# Patient Record
Sex: FEMALE | Race: WHITE | Employment: OTHER | URBAN - NONMETROPOLITAN AREA
[De-identification: names, ages, dates, MRNs, and addresses within clinical notes are randomized per-mention and may not be internally consistent; named-entity substitution may affect disease eponyms.]

---

## 2021-02-02 ENCOUNTER — HOSPITAL ENCOUNTER (INPATIENT)
Age: 86
LOS: 3 days | Discharge: HOME | DRG: 189 | End: 2021-02-05
Attending: FAMILY MEDICINE | Admitting: HOSPITALIST
Payer: MEDICARE

## 2021-02-02 ENCOUNTER — APPOINTMENT (OUTPATIENT)
Dept: GENERAL RADIOLOGY | Age: 86
DRG: 189 | End: 2021-02-02
Payer: MEDICARE

## 2021-02-02 DIAGNOSIS — E87.1 HYPONATREMIA: ICD-10-CM

## 2021-02-02 DIAGNOSIS — J12.9 VIRAL PNEUMONIA: ICD-10-CM

## 2021-02-02 DIAGNOSIS — T73.2XXA FATIGUE DUE TO EXPOSURE, INITIAL ENCOUNTER: Primary | ICD-10-CM

## 2021-02-02 DIAGNOSIS — R91.8 OPACITIES OF BOTH LUNGS PRESENT ON CHEST X-RAY: ICD-10-CM

## 2021-02-02 PROBLEM — N39.0 UTI (URINARY TRACT INFECTION): Status: ACTIVE | Noted: 2021-02-02

## 2021-02-02 LAB
ALBUMIN SERPL-MCNC: 2.6 GM/DL (ref 3.4–5)
ALP BLD-CCNC: 64 U/L (ref 46–116)
ALT SERPL-CCNC: 37 U/L (ref 14–63)
AMORPHOUS: ABNORMAL
ANION GAP: 10 MEQ/L (ref 8–16)
AST SERPL-CCNC: 20 U/L (ref 15–37)
BACTERIA: ABNORMAL
BASOPHILS # BLD: 0.2 %
BASOPHILS ABSOLUTE: 0 THOU/MM3 (ref 0–0.1)
BILIRUB SERPL-MCNC: 0.9 MG/DL (ref 0.2–1)
BILIRUBIN URINE: NEGATIVE
BLOOD, URINE: ABNORMAL
BUN BLDV-MCNC: 28 MG/DL (ref 7–18)
CASTS UA: ABNORMAL /LPF
CHARACTER, URINE: CLEAR
CHLORIDE BLD-SCNC: 92 MEQ/L (ref 98–107)
CO2: 27 MEQ/L (ref 21–32)
COLOR: YELLOW
CREAT SERPL-MCNC: 1.3 MG/DL (ref 0.6–1.3)
CRYSTALS, UA: ABNORMAL
EKG ATRIAL RATE: 100 BPM
EKG P AXIS: 86 DEGREES
EKG P-R INTERVAL: 180 MS
EKG Q-T INTERVAL: 338 MS
EKG QRS DURATION: 94 MS
EKG QTC CALCULATION (BAZETT): 436 MS
EKG R AXIS: 45 DEGREES
EKG T AXIS: 94 DEGREES
EKG VENTRICULAR RATE: 100 BPM
EOSINOPHIL # BLD: 0.4 %
EOSINOPHILS ABSOLUTE: 0 THOU/MM3 (ref 0–0.4)
EPITHELIAL CELLS, UA: ABNORMAL /HPF
GFR, ESTIMATED: 41 ML/MIN/1.73M2
GLUCOSE BLD-MCNC: 147 MG/DL (ref 74–106)
GLUCOSE, URINE: NEGATIVE MG/DL
HCT VFR BLD CALC: 42 % (ref 37–47)
HEMOGLOBIN: 14 GM/DL (ref 12–16)
IMMATURE GRANS (ABS): 0.26 THOU/MM3 (ref 0–0.07)
IMMATURE GRANULOCYTES: 2.4 %
KETONES, URINE: NEGATIVE
LACTATE: 1.6 MMOL/L (ref 0.9–1.7)
LEUKOCYTE ESTERASE, URINE: ABNORMAL
LYMPHOCYTES # BLD: 16.2 %
LYMPHOCYTES ABSOLUTE: 1.8 THOU/MM3 (ref 1–4.8)
MCH RBC QN AUTO: 29.4 PG (ref 27–31)
MCHC RBC AUTO-ENTMCNC: 33.3 GM/DL (ref 33–37)
MCV RBC AUTO: 88.3 FL (ref 81–99)
MONOCYTES # BLD: 12 %
MONOCYTES ABSOLUTE: 1.3 THOU/MM3 (ref 0.4–1.3)
MUCUS: ABNORMAL
NITRITE, URINE: NEGATIVE
NUCLEATED RED BLOOD CELLS: 0 /100 WBC
PDW BLD-RTO: 13.5 % (ref 11.5–14.5)
PH UA: 6 (ref 5–9)
PLATELET # BLD: 341 THOU/MM3 (ref 130–400)
PMV BLD AUTO: 7 FL (ref 7.4–10.4)
POC CALCIUM: 8.5 MG/DL (ref 8.5–10.1)
POTASSIUM SERPL-SCNC: 4.4 MEQ/L (ref 3.5–5.1)
PROCALCITONIN: 0.09 NG/ML (ref 0.01–0.09)
PROTEIN UA: 30 MG/DL
RBC # BLD: 4.76 MILL/MM3 (ref 4.2–5.4)
RBC UA: ABNORMAL /HPF
REFLEX TO URINE C & S: ABNORMAL
SEG NEUTROPHILS: 68.8 %
SEGMENTED NEUTROPHILS ABSOLUTE COUNT: 7.4 THOU/MM3 (ref 1.8–7.7)
SODIUM BLD-SCNC: 129 MEQ/L (ref 135–145)
SODIUM BLD-SCNC: 129 MEQ/L (ref 136–145)
SPECIFIC GRAVITY UA: 1.01 (ref 1–1.03)
TOTAL PROTEIN: 6.9 GM/DL (ref 6.4–8.2)
TROPONIN I: < 0.017 NG/ML (ref 0.02–0.06)
UROBILINOGEN, URINE: 0.2 EU/DL (ref 0–1)
WBC # BLD: 11.2 THOU/MM3 (ref 4.8–10.8)
WBC UA: ABNORMAL /HPF

## 2021-02-02 PROCEDURE — 71045 X-RAY EXAM CHEST 1 VIEW: CPT

## 2021-02-02 PROCEDURE — 2580000003 HC RX 258: Performed by: FAMILY MEDICINE

## 2021-02-02 PROCEDURE — 99223 1ST HOSP IP/OBS HIGH 75: CPT | Performed by: PHYSICIAN ASSISTANT

## 2021-02-02 PROCEDURE — 93005 ELECTROCARDIOGRAM TRACING: CPT | Performed by: FAMILY MEDICINE

## 2021-02-02 PROCEDURE — 84484 ASSAY OF TROPONIN QUANT: CPT

## 2021-02-02 PROCEDURE — 83605 ASSAY OF LACTIC ACID: CPT

## 2021-02-02 PROCEDURE — 6370000000 HC RX 637 (ALT 250 FOR IP): Performed by: PHYSICIAN ASSISTANT

## 2021-02-02 PROCEDURE — 85025 COMPLETE CBC W/AUTO DIFF WBC: CPT

## 2021-02-02 PROCEDURE — 1200000003 HC TELEMETRY R&B

## 2021-02-02 PROCEDURE — 87040 BLOOD CULTURE FOR BACTERIA: CPT

## 2021-02-02 PROCEDURE — 81001 URINALYSIS AUTO W/SCOPE: CPT

## 2021-02-02 PROCEDURE — 36415 COLL VENOUS BLD VENIPUNCTURE: CPT

## 2021-02-02 PROCEDURE — 80053 COMPREHEN METABOLIC PANEL: CPT

## 2021-02-02 PROCEDURE — 84295 ASSAY OF SERUM SODIUM: CPT

## 2021-02-02 PROCEDURE — 2580000003 HC RX 258: Performed by: PHYSICIAN ASSISTANT

## 2021-02-02 PROCEDURE — 84145 PROCALCITONIN (PCT): CPT

## 2021-02-02 PROCEDURE — 93010 ELECTROCARDIOGRAM REPORT: CPT | Performed by: NUCLEAR MEDICINE

## 2021-02-02 PROCEDURE — 99284 EMERGENCY DEPT VISIT MOD MDM: CPT

## 2021-02-02 PROCEDURE — 2709999900 HC NON-CHARGEABLE SUPPLY

## 2021-02-02 PROCEDURE — 6360000002 HC RX W HCPCS: Performed by: PHYSICIAN ASSISTANT

## 2021-02-02 RX ORDER — POLYETHYLENE GLYCOL 3350 17 G/17G
17 POWDER, FOR SOLUTION ORAL DAILY PRN
Status: DISCONTINUED | OUTPATIENT
Start: 2021-02-02 | End: 2021-02-05 | Stop reason: HOSPADM

## 2021-02-02 RX ORDER — SODIUM CHLORIDE 0.9 % (FLUSH) 0.9 %
10 SYRINGE (ML) INJECTION EVERY 12 HOURS SCHEDULED
Status: DISCONTINUED | OUTPATIENT
Start: 2021-02-02 | End: 2021-02-05 | Stop reason: HOSPADM

## 2021-02-02 RX ORDER — IPRATROPIUM BROMIDE AND ALBUTEROL SULFATE 2.5; .5 MG/3ML; MG/3ML
1 SOLUTION RESPIRATORY (INHALATION) EVERY 4 HOURS PRN
Status: DISCONTINUED | OUTPATIENT
Start: 2021-02-02 | End: 2021-02-05 | Stop reason: HOSPADM

## 2021-02-02 RX ORDER — SODIUM CHLORIDE 9 MG/ML
INJECTION, SOLUTION INTRAVENOUS CONTINUOUS
Status: DISCONTINUED | OUTPATIENT
Start: 2021-02-02 | End: 2021-02-04

## 2021-02-02 RX ORDER — PRAVASTATIN SODIUM 20 MG
20 TABLET ORAL DAILY
Status: DISCONTINUED | OUTPATIENT
Start: 2021-02-02 | End: 2021-02-05 | Stop reason: HOSPADM

## 2021-02-02 RX ORDER — MECLIZINE HYDROCHLORIDE CHEWABLE TABLETS 25 MG/1
25 TABLET, CHEWABLE ORAL 3 TIMES DAILY PRN
Status: DISCONTINUED | OUTPATIENT
Start: 2021-02-02 | End: 2021-02-05 | Stop reason: HOSPADM

## 2021-02-02 RX ORDER — ASPIRIN 81 MG/1
81 TABLET, CHEWABLE ORAL DAILY
Status: DISCONTINUED | OUTPATIENT
Start: 2021-02-02 | End: 2021-02-05 | Stop reason: HOSPADM

## 2021-02-02 RX ORDER — SODIUM CHLORIDE 0.9 % (FLUSH) 0.9 %
10 SYRINGE (ML) INJECTION PRN
Status: DISCONTINUED | OUTPATIENT
Start: 2021-02-02 | End: 2021-02-05 | Stop reason: HOSPADM

## 2021-02-02 RX ORDER — LOSARTAN POTASSIUM 100 MG/1
100 TABLET ORAL DAILY
Status: DISCONTINUED | OUTPATIENT
Start: 2021-02-02 | End: 2021-02-05 | Stop reason: HOSPADM

## 2021-02-02 RX ORDER — ACETAMINOPHEN 325 MG/1
650 TABLET ORAL EVERY 6 HOURS PRN
Status: DISCONTINUED | OUTPATIENT
Start: 2021-02-02 | End: 2021-02-05 | Stop reason: HOSPADM

## 2021-02-02 RX ORDER — PROMETHAZINE HYDROCHLORIDE 25 MG/1
12.5 TABLET ORAL EVERY 6 HOURS PRN
Status: DISCONTINUED | OUTPATIENT
Start: 2021-02-02 | End: 2021-02-05 | Stop reason: HOSPADM

## 2021-02-02 RX ORDER — ATENOLOL 25 MG/1
25 TABLET ORAL DAILY
Status: DISCONTINUED | OUTPATIENT
Start: 2021-02-02 | End: 2021-02-05 | Stop reason: HOSPADM

## 2021-02-02 RX ORDER — 0.9 % SODIUM CHLORIDE 0.9 %
500 INTRAVENOUS SOLUTION INTRAVENOUS ONCE
Status: COMPLETED | OUTPATIENT
Start: 2021-02-02 | End: 2021-02-02

## 2021-02-02 RX ORDER — ONDANSETRON 2 MG/ML
4 INJECTION INTRAMUSCULAR; INTRAVENOUS EVERY 6 HOURS PRN
Status: DISCONTINUED | OUTPATIENT
Start: 2021-02-02 | End: 2021-02-05 | Stop reason: HOSPADM

## 2021-02-02 RX ORDER — ACETAMINOPHEN 650 MG/1
650 SUPPOSITORY RECTAL EVERY 6 HOURS PRN
Status: DISCONTINUED | OUTPATIENT
Start: 2021-02-02 | End: 2021-02-05 | Stop reason: HOSPADM

## 2021-02-02 RX ORDER — CLONIDINE HYDROCHLORIDE 0.1 MG/1
0.1 TABLET ORAL EVERY 4 HOURS PRN
Status: DISCONTINUED | OUTPATIENT
Start: 2021-02-02 | End: 2021-02-05 | Stop reason: HOSPADM

## 2021-02-02 RX ADMIN — ASPIRIN 81 MG: 81 TABLET, CHEWABLE ORAL at 17:42

## 2021-02-02 RX ADMIN — SODIUM CHLORIDE 500 ML: 9 INJECTION, SOLUTION INTRAVENOUS at 10:46

## 2021-02-02 RX ADMIN — ENOXAPARIN SODIUM 30 MG: 30 INJECTION SUBCUTANEOUS at 17:37

## 2021-02-02 RX ADMIN — ATENOLOL 25 MG: 25 TABLET ORAL at 17:42

## 2021-02-02 RX ADMIN — PRAVASTATIN SODIUM 20 MG: 20 TABLET ORAL at 21:17

## 2021-02-02 RX ADMIN — SODIUM CHLORIDE: 9 INJECTION, SOLUTION INTRAVENOUS at 17:36

## 2021-02-02 SDOH — HEALTH STABILITY: MENTAL HEALTH: HOW OFTEN DO YOU HAVE A DRINK CONTAINING ALCOHOL?: NEVER

## 2021-02-02 ASSESSMENT — ENCOUNTER SYMPTOMS
VOMITING: 0
SHORTNESS OF BREATH: 0
COUGH: 0
NAUSEA: 0
SORE THROAT: 0

## 2021-02-02 NOTE — ED NOTES
Pt assited to room with family per private w/c. Pt pale. Daughter states was helping pt to br at home when she said she was weak, light headed and daughter helped her to floor. Pt denies any injury from falling. Daughter states she is positive for ovid on jan 23 and also had urine tested for uti that same day. Pt finished her antibiotic and dexamethasone. Pt eating well. Denies nausea or diarrhea.        Geno Rose RN  02/02/21 7621

## 2021-02-02 NOTE — ED NOTES
Pt states is resting comfortably. Dr Alem Cox speaking with family.        Mary Ann Mccarthy, RN  02/02/21 4227

## 2021-02-02 NOTE — ED NOTES
Care map return call and pt will go to telemetry unit and family permitted to visit. But no beds available at present. Family made aware.        Alicia Rodriguez RN  02/02/21 5509

## 2021-02-02 NOTE — ED NOTES
Up to bedside commode. Michael well. States she is feeling better.        Loan Neri, RN  02/02/21 [address], RN  02/02/21 2099

## 2021-02-02 NOTE — H&P
Hospitalist History & Physical    Patient:  Eugenia Cortez    Unit/Bed:6K-24/024-A  YOB: 1931  MRN: [id number]   Acct: [de-identified]   PCP: Juventino Owen MD  Code Status: Full Code    Date of Service: Pt seen/examined on 02/02/21 and admitted to Inpatient with expected LOS greater than two midnights due to medical therapy. Chief Complaint: weakness    Assessment/Plan:    1. Hyponatremia, mild: Na 129. Likely hypovolemic / dehydration. Check urine lytes. Start NS at 100cc/hr. Monitor Na Q8h. Notify provider of increase in Na >8meq in less than 24hrs. 2. Weakness: Likely due to dehydration. Check Orthostats, TSH. PT/OT. 3. Recent COVID-19: Positive on 1/22. 4. Acute hypoxic respiratory failure: CXR appearance consistent with atypical PNA. Per report, pt was placed on O2 in ED for SpO2 in the 80s with ambulation. This could just be post covid changes on XR and hypoxia. Pt is not complaining of any SOB, fever/chills, or productive cough. WBC only mildly elevated, 11.2. Will hold off on ABX for now. Check PCT, repeat CBC in AM. DuoNeb. IS. Wean O2 as tolerated. 5. CKD stage III: Cr stable with baseline. BMP in AM. Will hold her home losartan for today given her hyponatremia and dehydration. If Cr remains stable tomorrow, can resume. 6. Essential HTN: Cont home atenolol, clonidine. Losartan held as stated above. History of Present Illness:  29-year-old female with past medical history CKD, bladder cancer, hypertension who presented to the ED complaining of fatigue, weakness, dizziness. The patient states that she was diagnosed with Covid on 1/22/21. She states early this morning she woke up to use the restroom and became very weak, lightheaded and daughter helped her to the ground. The patient's daughter states that she was disoriented at that time. She is now AOx4, states she feels her weakness is improving. She reports mild dry cough.   Denies fever/chills, shortness of breath, chest pain, abdominal pain, N/V/D, dysuria, urinary frequency/urgency. Patient is afebrile, vital signs stable. Labs show hyponatremia, mild leukocytosis. Patient admitted to hospitalist service for further management. Review of Systems: Pertinent positives as noted in the HPI. All other systems reviewed and negative. Past Medical History:        Diagnosis Date    Bladder cancer [facility]) 2007    Chronic kidney disease, stage III (moderate)     Diastolic dysfunction     Hyperlipidemia     Unspecified essential hypertension        Past Surgical History:        Procedure Laterality Date    JOINT REPLACEMENT Right     hip       Home Medications:   No current facility-administered medications on file prior to encounter. Current Outpatient Medications on File Prior to Encounter   Medication Sig Dispense Refill    Coenzyme Q10 (CO Q 10) 100 MG CAPS Take by mouth      Calcium Carbonate (CALCIUM 600 PO) Take by mouth      GLUCOSAMINE SULFATE PO Take 1,000 mg by mouth      pravastatin (PRAVACHOL) 40 MG tablet Take 20 mg by mouth daily       atenolol (TENORMIN) 25 MG tablet Take 25 mg by mouth daily.  aspirin 81 MG tablet Take 81 mg by mouth daily.  meclizine (ANTIVERT) 12.5 MG tablet Take 25 mg by mouth 3 times daily as needed.  losartan (COZAAR) 50 MG tablet Take 100 mg by mouth daily       therapeutic multivitamin-minerals (THERAGRAN-M) tablet Take 1 tablet by mouth daily.  cloNIDine (CATAPRES) 0.1 MG tablet Take 0.1 mg by mouth as needed. Allergies:    Morphine and Sulfa antibiotics    Social History:    reports that she has never smoked. She has never used smokeless tobacco. She reports that she does not drink alcohol. Family History:   History reviewed. No pertinent family history.     Diet:  DIET GENERAL; No Added Salt (3-4 GM)      Physical Exam:  /61   Pulse 101   Temp 98 °F (36.7 °C) (Oral)   Resp 20   Ht 5' 8\" (1.727 m)   Wt 153 lb 4.8 oz (69.5 kg)   SpO2 94%   BMI 23.31 kg/m²   General:   Pleasant female. NAD. HEENT:  normocephalic and atraumatic. No scleral icterus. PERR. Neck: supple. No JVD. No thyromegaly. Lungs: clear to auscultation. No retractions  Cardiac: RRR without murmur. Abdomen: soft. Nontender. Bowel sounds positive. Extremities:  No clubbing, cyanosis, or edema x 4. Vasculature: capillary refill < 3 seconds. Palpable LE pulses bilaterally. Skin:  warm and dry. No rashes or lesions. Psych:  Alert and oriented x3. Affect appropriate  Lymph:  No supraclavicular adenopathy. Neurologic:  No focal deficit. No seizures. Data: (All radiographs, tracings, PFTs, and imaging are personally viewed and interpreted unless otherwise noted)  Labs:   Recent Labs     02/02/21  0845   WBC 11.2*   HGB 14.0   HCT 42.0        Recent Labs     02/02/21  0845   *   K 4.4   CL 92*   CO2 27   BUN 28*   CREATININE 1.3     Recent Labs     02/02/21  0845   AST 20   ALT 37   BILITOT 0.9   ALKPHOS 64     No results for input(s): INR in the last 72 hours. Recent Labs     02/02/21  1053   TROPONINI < 0.017     Urinalysis:   Lab Results   Component Value Date    NITRU NEGATIVE 02/02/2021    WBCUA 2-4 02/02/2021    WBCUA 50-75 09/23/2011    BACTERIA FEW 02/02/2021    RBCUA 0-2 02/02/2021    RBCUA 5-10 09/23/2011    BLOODU TRACE 02/02/2021    SPECGRAV 1.015 02/02/2021    GLUCOSEU neg 09/10/2014       EKG: normal sinus rhythm, no blocks or conduction defects, no ischemic changes    Radiology:  XR CHEST PORTABLE   Final Result   Bilateral slightly lower lobe and peripheral predominant lung opacities likely acute infectious infiltrates. Some nodularity of the opacities such that the underlying lung nodules are not excluded. **This report has been created using voice recognition software. It may contain minor errors which are inherent in voice recognition technology. **      Final report electronically signed by Dr. Jeff Haq on 2/2/2021 9:38 AM        Xr Chest Portable    Result Date: 2/2/2021  PROCEDURE: XR CHEST PORTABLE CLINICAL INFORMATION: short of breath . COMPARISON: 9/23/2011 TECHNIQUE: Portable upright FINDINGS: Heart size normal. Mediastinum is not widened. Peripheral bilateral lower lobe predominant opacities silhouette which have a nodular appearance. More patchy appearance at the lowest most portion of the lungs. No effusions. Pulmonary vessels are not congested. Numerous EKG leads overlie the upper chest. This decreases detail the right apex. Bilateral slightly lower lobe and peripheral predominant lung opacities likely acute infectious infiltrates. Some nodularity of the opacities such that the underlying lung nodules are not excluded. **This report has been created using voice recognition software. It may contain minor errors which are inherent in voice recognition technology. ** Final report electronically signed by Dr. Jeff Haq on 2/2/2021 9:38 AM        Tele:   [x] yes             [] no      Thank you Eunice Eubanks MD for the opportunity to be involved in this patient's care.     Electronically signed by Adolfo Zhu PA-C on 2/2/2021 at 5:29 PM

## 2021-02-02 NOTE — ED NOTES
Family not sure of admission at present. Aware of no visitor policy when on Covid unit. Manager checking on visitor status.        Geno Rose RN  02/02/21 96 [facility] [place]  02/02/21 1318

## 2021-02-02 NOTE — ED NOTES
Up to bedside co mode with assist.  Pt lethargic. Daughters assisted. oxygen 80%  After arranging back in bed. O2 applied at 2l/nc. Slight lorenzo.       Shay Mendoza RN  02/02/21 2208

## 2021-02-02 NOTE — ED PROVIDER NOTES
[facility]  eMERGENCY dEPARTMENT eNCOUnter          [address] [facility]       Chief Complaint   Patient presents with    Fatigue    Dizziness    Urinary Tract Infection       Nurses Notes reviewed and I agree except as noted in the HPI. HISTORY OF PRESENT ILLNESS    Vernon Tran is a 80 y.o. female who presents fatigue. Patient diagnosed with covid on January 22, 2021. In recent days according to daughters who are at bedside the patient is fatigued and very weak limited mobility at home. She denies fever,chills. Denies urinary symptoms. Notes good oral intake. REVIEW OF SYSTEMS     Review of Systems   Constitutional: Positive for fatigue. Negative for chills and fever. HENT: Negative for congestion and sore throat. Respiratory: Negative for cough and shortness of breath. Cardiovascular: Negative for chest pain and palpitations. Gastrointestinal: Negative for nausea and vomiting. Genitourinary: Negative for dysuria and frequency. Neurological: Positive for dizziness and weakness. Psychiatric/Behavioral: Negative for agitation and behavioral problems. PAST MEDICAL HISTORY    has a past medical history of Bladder cancer ([address] Chronic kidney disease, stage III (moderate), Diastolic dysfunction, Hyperlipidemia, and Unspecified essential hypertension. SURGICAL HISTORY      has a past surgical history that includes joint replacement (Right). CURRENT MEDICATIONS       Previous Medications    ASPIRIN 81 MG TABLET    Take 81 mg by mouth daily. ATENOLOL (TENORMIN) 25 MG TABLET    Take 25 mg by mouth daily. CALCIUM CARBONATE (CALCIUM 600 PO)    Take by mouth    CLONIDINE (CATAPRES) 0.1 MG TABLET    Take 0.1 mg by mouth as needed.     COENZYME Q10 (CO Q 10) 100 MG CAPS    Take by mouth    GLUCOSAMINE SULFATE PO    Take 1,000 mg by mouth    LOSARTAN (COZAAR) 50 MG TABLET    Take 100 mg by mouth daily     MECLIZINE (ANTIVERT) 12.5 MG TABLET    Take 25 mg by mouth 3 times daily as needed. PRAVASTATIN (PRAVACHOL) 40 MG TABLET    Take 20 mg by mouth daily     THERAPEUTIC MULTIVITAMIN-MINERALS (THERAGRAN-M) TABLET    Take 1 tablet by mouth daily. ALLERGIES     is allergic to morphine and sulfa antibiotics. FAMILY HISTORY     has no family status information on file. family history is not on file. SOCIAL HISTORY      reports that she has never smoked. She has never used smokeless tobacco. She reports that she does not drink alcohol. PHYSICAL EXAM     INITIAL VITALS:  height is 5' 8\" (1.727 m) and weight is 145 lb (65.8 kg). Her temporal temperature is 98.4 °F (36.9 °C). Her blood pressure is 107/56 (abnormal) and her pulse is 97. Her respiration is 25 and oxygen saturation is 97%. Physical Exam  Vitals signs and nursing note reviewed. Constitutional:       General: She is not in acute distress. Appearance: She is ill-appearing. HENT:      Nose: Nose normal.   Eyes:      Conjunctiva/sclera: Conjunctivae normal.      Pupils: Pupils are equal, round, and reactive to light. Neck:      Musculoskeletal: Normal range of motion and neck supple. Cardiovascular:      Rate and Rhythm: Normal rate and regular rhythm. Pulses: Normal pulses. Heart sounds: Normal heart sounds. Pulmonary:      Effort: No respiratory distress. Breath sounds: No wheezing. Abdominal:      General: Abdomen is flat. There is no distension. Tenderness: There is no abdominal tenderness. Lymphadenopathy:      Cervical: No cervical adenopathy. Skin:     General: Skin is dry. Capillary Refill: Capillary refill takes less than 2 seconds. Findings: No erythema or rash. Neurological:      General: No focal deficit present. Mental Status: She is alert and oriented to person, place, and time.          DIFFERENTIAL DIAGNOSIS:   Covid,uti,metabolic derangement,dehydration,    DIAGNOSTIC RESULTS     EKG: All EKG's are interpreted by the Emergency Department Physician who either signs or Co-signs this chart in the absence of a cardiologist.      RADIOLOGY: non-plain film images(s) such as CT, Ultrasound and MRI are read by the radiologist.      XR CHEST PORTABLE (Final result)  Result time 02/02/21 09:38:31  Final result by Ton Casillas MD (02/02/21 09:38:31)                Impression:    Bilateral slightly lower lobe and peripheral predominant lung opacities likely acute infectious infiltrates. Some nodularity of the opacities such that the underlying lung nodules are not excluded. **This report has been created using voice recognition software.  It may contain minor errors which are inherent in voice recognition technology. **     Final report electronically signed by Dr. Macarena Crowe on 2/2/2021 9:38 AM            Narrative:    PROCEDURE: XR CHEST PORTABLE     CLINICAL INFORMATION: short of breath . COMPARISON: 9/23/2011     TECHNIQUE: Portable upright     FINDINGS: Heart size normal. Mediastinum is not widened. Peripheral bilateral lower lobe predominant opacities silhouette which have a nodular appearance. More patchy appearance at the lowest most portion of the lungs. No effusions. Pulmonary vessels are not congested.    Numerous EKG leads overlie the upper chest. This decreases detail the right apex.                     LABS:   Labs Reviewed   COMPREHENSIVE METABOLIC PANEL - Abnormal; Notable for the following components:       Result Value    Glucose 147 (*)     BUN 28 (*)     Sodium 129 (*)     Chloride 92 (*)     Albumin 2.6 (*)     All other components within normal limits   CBC WITH AUTO DIFFERENTIAL - Abnormal; Notable for the following components:    WBC 11.2 (*)     MPV 7.0 (*)     All other components within normal limits   URINE RT REFLEX TO CULTURE - Abnormal; Notable for the following components:    Blood, Urine TRACE (*)     Protein, UA 30 (*)     Leukocyte Esterase, Urine TRACE (*)     All other components within normal limits   GLOMERULAR FILTRATION RATE, ESTIMATED - Abnormal; Notable for the following components:    GFR, Estimated 41 (*)     All other components within normal limits   CULTURE, BLOOD 1   CULTURE, BLOOD 2   LACTIC ACID   ANION GAP   TROPONIN   DIFFERENTIAL       EMERGENCY DEPARTMENT COURSE:   Vitals:    Vitals:    02/02/21 0915 02/02/21 1011 02/02/21 1017 02/02/21 1049   BP: (!) 118/56 92/72  (!) 107/56   Pulse: 93 101 103 97   Resp: 19 23 23 25   Temp:       TempSrc:       SpO2: 96% 97% 95% 97%   Weight:       Height:         On exam the patient is alert and oriented x4. She is not complaining of any particular symptoms she is afebrile. Initially on evaluation her pulse ox was in the upper 80 percentile with any form of exertion. She was placed on 2 L of oxygen and oxygen saturation improved to 95-96%. She does note a history of bladder cancer. Urinalysis did show trace amount of leukocytes. Patient is denying any current dysuria or increased frequency. Chest x-ray did show bilateral slightly lower lobe and peripheral predominant lung opacities likely acute infectious infiltrates. The lung picture based on radiographic would be consistent with her Covid diagnosis of approximately 10 days. Metabolic panel did show a sodium of 129, BUN of 28, creatinine of 1.3. I did start gentle hydration on this patient with normal saline. There is trace amount of leukocytes, and trace amount of blood. She was not experiencing any urinary symptoms at this time so I held off on any further antibiotics regarding her urinary tract. Troponin was negative. EKG showed a normal sinus rhythm. As relates to her bilateral lower lobe and peripheral predominant lung opacities likely reflecting an acute infectious infiltrate at this time based on her Covid diagnosis more than likely this is a viral pneumonia. I held off on any further antibiotics at this time.     CRITICAL CARE: CONSULTS:      PROCEDURES:  None    FINAL IMPRESSION      1. Fatigue due to exposure, initial encounter    2. Opacities of both lungs present on chest x-ray    3. Hyponatremia    4. Viral pneumonia          DISPOSITION/PLAN   Admit. PATIENT REFERRED TO:  No follow-up provider specified.     DISCHARGE MEDICATIONS:  New Prescriptions    No medications on file       (Please note that portions of this note were completed with a voice recognition program.  Efforts were made to edit the dictations but occasionally words are mis-transcribed.)    MD Monik Tierney MD  02/02/21 4123

## 2021-02-03 LAB
ALBUMIN SERPL-MCNC: 2.4 G/DL (ref 3.5–5.1)
ALP BLD-CCNC: 43 U/L (ref 38–126)
ALT SERPL-CCNC: 17 U/L (ref 11–66)
ANION GAP SERPL CALCULATED.3IONS-SCNC: 8 MEQ/L (ref 8–16)
AST SERPL-CCNC: 11 U/L (ref 5–40)
BILIRUB SERPL-MCNC: 0.5 MG/DL (ref 0.3–1.2)
BUN BLDV-MCNC: 22 MG/DL (ref 7–22)
CALCIUM SERPL-MCNC: 7.9 MG/DL (ref 8.5–10.5)
CHLORIDE BLD-SCNC: 101 MEQ/L (ref 98–111)
CO2: 22 MEQ/L (ref 23–33)
CREAT SERPL-MCNC: 0.9 MG/DL (ref 0.4–1.2)
CREATININE URINE: 105.3 MG/DL
EOSINOPHIL SMEAR: NORMAL
ERYTHROCYTE [DISTWIDTH] IN BLOOD BY AUTOMATED COUNT: 12.6 % (ref 11.5–14.5)
ERYTHROCYTE [DISTWIDTH] IN BLOOD BY AUTOMATED COUNT: 41.7 FL (ref 35–45)
GFR SERPL CREATININE-BSD FRML MDRD: 59 ML/MIN/1.73M2
GLUCOSE BLD-MCNC: 148 MG/DL (ref 70–108)
HCT VFR BLD CALC: 34.2 % (ref 37–47)
HEMOGLOBIN: 11.1 GM/DL (ref 12–16)
LACTIC ACID: 0.9 MMOL/L (ref 0.5–2.2)
MCH RBC QN AUTO: 29.4 PG (ref 26–33)
MCHC RBC AUTO-ENTMCNC: 32.5 GM/DL (ref 32.2–35.5)
MCV RBC AUTO: 90.7 FL (ref 81–99)
OSMOLALITY URINE: 508 MOSMOL/KG (ref 250–750)
PLATELET # BLD: 279 THOU/MM3 (ref 130–400)
PMV BLD AUTO: 9.3 FL (ref 9.4–12.4)
POTASSIUM REFLEX MAGNESIUM: 4.5 MEQ/L (ref 3.5–5.2)
RBC # BLD: 3.77 MILL/MM3 (ref 4.2–5.4)
SODIUM BLD-SCNC: 131 MEQ/L (ref 135–145)
SODIUM BLD-SCNC: 131 MEQ/L (ref 135–145)
SODIUM URINE: 49 MEQ/L
SPECIMEN: NORMAL
TOTAL PROTEIN: 5.3 G/DL (ref 6.1–8)
TSH SERPL DL<=0.05 MIU/L-ACNC: 1.76 UIU/ML (ref 0.4–4.2)
WBC # BLD: 9.1 THOU/MM3 (ref 4.8–10.8)

## 2021-02-03 PROCEDURE — 99232 SBSQ HOSP IP/OBS MODERATE 35: CPT | Performed by: NURSE PRACTITIONER

## 2021-02-03 PROCEDURE — 36415 COLL VENOUS BLD VENIPUNCTURE: CPT

## 2021-02-03 PROCEDURE — 80053 COMPREHEN METABOLIC PANEL: CPT

## 2021-02-03 PROCEDURE — 6370000000 HC RX 637 (ALT 250 FOR IP): Performed by: PHYSICIAN ASSISTANT

## 2021-02-03 PROCEDURE — 89190 NASAL SMEAR FOR EOSINOPHILS: CPT

## 2021-02-03 PROCEDURE — 6370000000 HC RX 637 (ALT 250 FOR IP): Performed by: NURSE PRACTITIONER

## 2021-02-03 PROCEDURE — 6360000002 HC RX W HCPCS: Performed by: PHYSICIAN ASSISTANT

## 2021-02-03 PROCEDURE — 84443 ASSAY THYROID STIM HORMONE: CPT

## 2021-02-03 PROCEDURE — 84295 ASSAY OF SERUM SODIUM: CPT

## 2021-02-03 PROCEDURE — 2580000003 HC RX 258: Performed by: PHYSICIAN ASSISTANT

## 2021-02-03 PROCEDURE — 82570 ASSAY OF URINE CREATININE: CPT

## 2021-02-03 PROCEDURE — 84300 ASSAY OF URINE SODIUM: CPT

## 2021-02-03 PROCEDURE — 87086 URINE CULTURE/COLONY COUNT: CPT

## 2021-02-03 PROCEDURE — 83605 ASSAY OF LACTIC ACID: CPT

## 2021-02-03 PROCEDURE — 85027 COMPLETE CBC AUTOMATED: CPT

## 2021-02-03 PROCEDURE — 1200000003 HC TELEMETRY R&B

## 2021-02-03 PROCEDURE — 83935 ASSAY OF URINE OSMOLALITY: CPT

## 2021-02-03 RX ORDER — ZINC SULFATE 50(220)MG
50 CAPSULE ORAL DAILY
Status: DISCONTINUED | OUTPATIENT
Start: 2021-02-03 | End: 2021-02-05 | Stop reason: HOSPADM

## 2021-02-03 RX ORDER — ASCORBIC ACID 500 MG
1000 TABLET ORAL DAILY
Status: DISCONTINUED | OUTPATIENT
Start: 2021-02-03 | End: 2021-02-05 | Stop reason: HOSPADM

## 2021-02-03 RX ORDER — VITAMIN B COMPLEX
1000 TABLET ORAL DAILY
Status: DISCONTINUED | OUTPATIENT
Start: 2021-02-03 | End: 2021-02-05 | Stop reason: HOSPADM

## 2021-02-03 RX ADMIN — ENOXAPARIN SODIUM 30 MG: 30 INJECTION SUBCUTANEOUS at 16:44

## 2021-02-03 RX ADMIN — SODIUM CHLORIDE: 9 INJECTION, SOLUTION INTRAVENOUS at 04:28

## 2021-02-03 RX ADMIN — ASPIRIN 81 MG: 81 TABLET, CHEWABLE ORAL at 08:33

## 2021-02-03 RX ADMIN — OXYCODONE HYDROCHLORIDE AND ACETAMINOPHEN 1000 MG: 500 TABLET ORAL at 16:04

## 2021-02-03 RX ADMIN — PRAVASTATIN SODIUM 20 MG: 20 TABLET ORAL at 19:55

## 2021-02-03 RX ADMIN — SODIUM CHLORIDE: 9 INJECTION, SOLUTION INTRAVENOUS at 15:03

## 2021-02-03 RX ADMIN — Medication 1000 UNITS: at 16:04

## 2021-02-03 RX ADMIN — Medication 50 MG: at 16:04

## 2021-02-03 RX ADMIN — ATENOLOL 25 MG: 25 TABLET ORAL at 08:34

## 2021-02-03 RX ADMIN — SODIUM CHLORIDE, PRESERVATIVE FREE 10 ML: 5 INJECTION INTRAVENOUS at 08:33

## 2021-02-03 NOTE — PROGRESS NOTES
Hospitalist Progress Note    Patient:  Honey Cortze      Unit/Bed:6K-24/024-A    YOB: 1931    MRN: [id number]       Acct: [de-identified]     PCP: Denisse Rosario MD    Date of Admission: 2/2/2021    Assessment/Plan:    1. Acute hypoxic respiratory failure. Question secondary to recent COVID 19 1/22. PCT negative. Afebrile. Encourage IS/Acapella. Add Zinc, Vit D and Vit C. Wean oxygen as able. 2. Leukocytosis, resolved. PCT negative. BC pending. 3. Hyponatremia, mild. Improved with hydration. Continue IVF. 4. Weakness due to recent viral illness. PT/OT. 5. CKD stage III. Creatinine stable. 6. Essential hypertension. Resume ARB with holding parameters. 7. HX bladder CA. 8. Chronic diastolic heart failure. No overt signs of decompensation. Chief Complaint: weakness    Hospital Course:     66-year-old female with past medical history CKD, bladder cancer, hypertension who presented to the ED complaining of fatigue, weakness, dizziness. The patient states that she was diagnosed with Covid on 1/22/21. She states early this morning she woke up to use the restroom and became very weak, lightheaded and daughter helped her to the ground. The patient's daughter states that she was disoriented at that time. She is now AOx4, states she feels her weakness is improving. She reports mild dry cough. Denies fever/chills, shortness of breath, chest pain, abdominal pain, N/V/D, dysuria, urinary frequency/urgency. Patient is afebrile, vital signs stable. Labs show hyponatremia, mild leukocytosis. Patient admitted to hospitalist service for further management. Subjective (past 24 hours): Up in chair. Denies SOB. No LOUIS. Weak. Feels better than on admission.        Medications:  Reviewed    Infusion Medications    sodium chloride 100 mL/hr at 02/03/21 3312     Scheduled Medications    aspirin  81 mg Oral Daily    atenolol  25 mg Oral Daily    pravastatin  20 mg Oral Daily    sodium chloride flush  10 mL Intravenous 2 times per day    enoxaparin  30 mg Subcutaneous Q24H    [Held by provider] losartan  100 mg Oral Daily     PRN Meds: cloNIDine, meclizine, sodium chloride flush, promethazine **OR** ondansetron, polyethylene glycol, acetaminophen **OR** acetaminophen, ipratropium-albuterol      Intake/Output Summary (Last 24 hours) at 2/3/2021 1250  Last data filed at 2/3/2021 1208  Gross per 24 hour   Intake 1996.73 ml   Output 100 ml   Net 1896.73 ml       Diet:  DIET GENERAL; No Added Salt (3-4 GM)    Exam:  BP (!) 119/49   Pulse 78   Temp 97.4 °F (36.3 °C) (Oral)   Resp 20   Ht 5' 8\" (1.727 m)   Wt 148 lb 9.6 oz (67.4 kg)   SpO2 94%   BMI 22.59 kg/m²     General appearance: No apparent distress, appears stated age and cooperative. HEENT: Pupils equal, round, and reactive to light. Conjunctivae/corneas clear. Neck: Supple, with full range of motion. No jugular venous distention. Trachea midline. Respiratory:  Normal respiratory effort. Crackles RLL  Cardiovascular: Regular rate and rhythm with normal S1/S2 without murmurs, rubs or gallops. Abdomen: Soft, non-tender, non-distended with normal bowel sounds. Musculoskeletal: passive and active ROM x 4 extremities. Skin: Skin color, texture, turgor normal.    Neurologic:  Neurovascularly intact without any focal sensory/motor deficits.  Cranial nerves: II-XII intact, grossly non-focal.  Psychiatric: Alert and oriented, thought content appropriate  Capillary Refill: Brisk,< 3 seconds   Peripheral Pulses: +2 palpable, equal bilaterally       Labs:   Recent Labs     02/02/21  0845 02/03/21  0306   WBC 11.2* 9.1   HGB 14.0 11.1*   HCT 42.0 34.2*    279     Recent Labs     02/02/21  0845 02/02/21  1748 02/03/21  0306 02/03/21  0922   * 129* 131* 131*   K 4.4  --  4.5  --    CL 92*  --  101  --    CO2 27  --  22*  --    BUN 28*  --  22  --    CREATININE 1.3  --  0.9  --    CALCIUM  --   --  7.9*  --      Recent Labs 02/02/21  0845 02/03/21  0306   AST 20 11   ALT 37 17   BILITOT 0.9 0.5   ALKPHOS 64 43     No results for input(s): INR in the last 72 hours. Recent Labs     02/02/21  1053   TROPONINI < 0.017     Recent Labs     02/02/21  1748   PROCAL 0.09       Microbiology:      Urinalysis:      Lab Results   Component Value Date    NITRU NEGATIVE 02/02/2021    WBCUA 2-4 02/02/2021    WBCUA 50-75 09/23/2011    BACTERIA FEW 02/02/2021    RBCUA 0-2 02/02/2021    RBCUA 5-10 09/23/2011    BLOODU TRACE 02/02/2021    SPECGRAV 1.015 02/02/2021    GLUCOSEU neg 09/10/2014       Radiology:  Xr Chest Portable    Result Date: 2/2/2021  PROCEDURE: XR CHEST PORTABLE CLINICAL INFORMATION: short of breath . COMPARISON: 9/23/2011 TECHNIQUE: Portable upright FINDINGS: Heart size normal. Mediastinum is not widened. Peripheral bilateral lower lobe predominant opacities silhouette which have a nodular appearance. More patchy appearance at the lowest most portion of the lungs. No effusions. Pulmonary vessels are not congested. Numerous EKG leads overlie the upper chest. This decreases detail the right apex. Bilateral slightly lower lobe and peripheral predominant lung opacities likely acute infectious infiltrates. Some nodularity of the opacities such that the underlying lung nodules are not excluded. **This report has been created using voice recognition software. It may contain minor errors which are inherent in voice recognition technology. ** Final report electronically signed by Dr. Macarena Crowe on 2/2/2021 9:38 AM      Code Status: Full Code      Active Hospital Problems    Diagnosis Date Noted    Viral pneumonia [J12.9] 02/02/2021    UTI (urinary tract infection) [N39.0] 02/02/2021       Electronically signed by SULEIMAN Reilly CNP on 2/3/2021 at 12:50 PM

## 2021-02-03 NOTE — CARE COORDINATION
DISASTER CHARTING    2/3/21, 7:50 AM EST    DISCHARGE ONGOING EVALUATION:     [facility] day: 1  Location: 9H-21/187-L Reason for admit: Viral pneumonia [J12.9]  UTI (urinary tract infection) [N39.0]   Barriers to Discharge: to [address], fatigue, dizziness, UTI, + Covid on01/22,  Na 131 was 129 (Na lab draws every 8 hrs,, IVF, dietitian to see, added PT/OT kirstin, strict I/O.      PCP: Hussein Allen MD  Readmission Risk Score: 16%  Patient Goals/Plan/Treatment Preferences: Met with patient daughters [facility] and Viji Frey as patient was asleep. They share their mother [facility] lives home alone, drives, has been very weak lately with +covid, uses [facility], and is unsure of [facility] at this time. Will follow PT and OT notes for recommendations.  She has PCP and can afford

## 2021-02-03 NOTE — PROGRESS NOTES
Patient ambulated in the hallway, approx 280 ft with supplemental oxygen 2L NC. Patient tolerated well. Oxygen saturation rechecked after ambulation and was 93% , pt states slight shortness of breath with walking.

## 2021-02-03 NOTE — FLOWSHEET NOTE
Pt was anointed      02/03/21 9096   Encounter Summary   Services provided to: Patient; Family; Patient and family together   Referral/Consult From: [address] [facility] Visiting Yes  (2/3 )   Complexity of Encounter Low   Length of Encounter 15 minutes   Routine   Type Initial   Assessment Approachable;Calm   Intervention [place];Prayer;Nurtured hope; Active listening;Empowerment;Sustaining presence/ Ministry of presence   Outcome Acceptance;Expressed gratitude;Encouraged; Hopeful;Receptive   Sacraments   Sacrament of Sick-Anointing Anointed

## 2021-02-04 PROBLEM — E44.0 MODERATE MALNUTRITION (HCC): Status: ACTIVE | Noted: 2021-02-04

## 2021-02-04 LAB
ANION GAP SERPL CALCULATED.3IONS-SCNC: 5 MEQ/L (ref 8–16)
BASOPHILS # BLD: 0.1 %
BASOPHILS ABSOLUTE: 0 THOU/MM3 (ref 0–0.1)
BUN BLDV-MCNC: 15 MG/DL (ref 7–22)
CALCIUM SERPL-MCNC: 8.1 MG/DL (ref 8.5–10.5)
CHLORIDE BLD-SCNC: 103 MEQ/L (ref 98–111)
CO2: 23 MEQ/L (ref 23–33)
CREAT SERPL-MCNC: 1 MG/DL (ref 0.4–1.2)
EOSINOPHIL # BLD: 1 %
EOSINOPHILS ABSOLUTE: 0.1 THOU/MM3 (ref 0–0.4)
ERYTHROCYTE [DISTWIDTH] IN BLOOD BY AUTOMATED COUNT: 12.7 % (ref 11.5–14.5)
ERYTHROCYTE [DISTWIDTH] IN BLOOD BY AUTOMATED COUNT: 41.7 FL (ref 35–45)
GFR SERPL CREATININE-BSD FRML MDRD: 52 ML/MIN/1.73M2
GLUCOSE BLD-MCNC: 135 MG/DL (ref 70–108)
HCT VFR BLD CALC: 31.7 % (ref 37–47)
HEMOGLOBIN: 10.3 GM/DL (ref 12–16)
IMMATURE GRANS (ABS): 0.15 THOU/MM3 (ref 0–0.07)
IMMATURE GRANULOCYTES: 1.7 %
LYMPHOCYTES # BLD: 13.2 %
LYMPHOCYTES ABSOLUTE: 1.2 THOU/MM3 (ref 1–4.8)
MCH RBC QN AUTO: 29.3 PG (ref 26–33)
MCHC RBC AUTO-ENTMCNC: 32.5 GM/DL (ref 32.2–35.5)
MCV RBC AUTO: 90.3 FL (ref 81–99)
MONOCYTES # BLD: 11.7 %
MONOCYTES ABSOLUTE: 1.1 THOU/MM3 (ref 0.4–1.3)
NUCLEATED RED BLOOD CELLS: 0 /100 WBC
ORGANISM: ABNORMAL
PLATELET # BLD: 270 THOU/MM3 (ref 130–400)
PMV BLD AUTO: 9.3 FL (ref 9.4–12.4)
POTASSIUM SERPL-SCNC: 4.6 MEQ/L (ref 3.5–5.2)
RBC # BLD: 3.51 MILL/MM3 (ref 4.2–5.4)
SEG NEUTROPHILS: 72.3 %
SEGMENTED NEUTROPHILS ABSOLUTE COUNT: 6.5 THOU/MM3 (ref 1.8–7.7)
SODIUM BLD-SCNC: 131 MEQ/L (ref 135–145)
URINE CULTURE, ROUTINE: ABNORMAL
WBC # BLD: 9 THOU/MM3 (ref 4.8–10.8)

## 2021-02-04 PROCEDURE — 6370000000 HC RX 637 (ALT 250 FOR IP): Performed by: PHYSICIAN ASSISTANT

## 2021-02-04 PROCEDURE — 1200000003 HC TELEMETRY R&B

## 2021-02-04 PROCEDURE — 94760 N-INVAS EAR/PLS OXIMETRY 1: CPT

## 2021-02-04 PROCEDURE — 2580000003 HC RX 258: Performed by: PHYSICIAN ASSISTANT

## 2021-02-04 PROCEDURE — 85025 COMPLETE CBC W/AUTO DIFF WBC: CPT

## 2021-02-04 PROCEDURE — 6370000000 HC RX 637 (ALT 250 FOR IP): Performed by: NURSE PRACTITIONER

## 2021-02-04 PROCEDURE — 94669 MECHANICAL CHEST WALL OSCILL: CPT

## 2021-02-04 PROCEDURE — 80048 BASIC METABOLIC PNL TOTAL CA: CPT

## 2021-02-04 PROCEDURE — 36415 COLL VENOUS BLD VENIPUNCTURE: CPT

## 2021-02-04 PROCEDURE — 97162 PT EVAL MOD COMPLEX 30 MIN: CPT

## 2021-02-04 PROCEDURE — 97116 GAIT TRAINING THERAPY: CPT

## 2021-02-04 PROCEDURE — 6360000002 HC RX W HCPCS: Performed by: PHYSICIAN ASSISTANT

## 2021-02-04 PROCEDURE — 99232 SBSQ HOSP IP/OBS MODERATE 35: CPT | Performed by: NURSE PRACTITIONER

## 2021-02-04 RX ADMIN — Medication 1000 UNITS: at 08:09

## 2021-02-04 RX ADMIN — SODIUM CHLORIDE, PRESERVATIVE FREE 10 ML: 5 INJECTION INTRAVENOUS at 19:52

## 2021-02-04 RX ADMIN — ENOXAPARIN SODIUM 30 MG: 30 INJECTION SUBCUTANEOUS at 17:17

## 2021-02-04 RX ADMIN — PRAVASTATIN SODIUM 20 MG: 20 TABLET ORAL at 19:51

## 2021-02-04 RX ADMIN — OXYCODONE HYDROCHLORIDE AND ACETAMINOPHEN 1000 MG: 500 TABLET ORAL at 08:08

## 2021-02-04 RX ADMIN — ATENOLOL 25 MG: 25 TABLET ORAL at 08:09

## 2021-02-04 RX ADMIN — Medication 50 MG: at 08:09

## 2021-02-04 RX ADMIN — ASPIRIN 81 MG: 81 TABLET, CHEWABLE ORAL at 08:09

## 2021-02-04 RX ADMIN — SODIUM CHLORIDE: 9 INJECTION, SOLUTION INTRAVENOUS at 01:25

## 2021-02-04 NOTE — PROGRESS NOTES
Comprehensive Nutrition Assessment    Type and Reason for Visit:  Initial, Consult(ONS)    Nutrition Recommendations/Plan:   Continue current diet. ONS changed to Magic Cup BID, disliked Ensure Compact. Consider MVI. Nutrition Assessment:    Pt. moderately malnourished AEB criteria as listed below. At risk for further nutrition compromise r/t admit with viral pneumonia, weakness, and underlying medical condition (hx: bladder Ca, HTN, HLD, CKD, diastolic dysfunction). Nutrition recommendations/interventions as per above. Malnutrition Assessment:  Malnutrition Status: Moderate malnutrition    Context:  Acute Illness     Findings of the 6 clinical characteristics of malnutrition:  Energy Intake:  1 - 75% or less of estimated energy requirements for 7 or more days  Weight Loss:  No significant weight loss     Body Fat Loss:  1 - Mild body fat loss Orbital   Muscle Mass Loss:  No significant muscle mass loss    Fluid Accumulation:  No significant fluid accumulation     Strength:  Not Performed    Estimated Daily Nutrient Needs:  Energy (kcal):  7405-7602 kcals (25-30 kcals/kg/day); Weight Used for Energy Requirements:  Current(68 kg 2/4/21)     Protein (g):  82 grams or more (1.2 grams/kg/day); Weight Used for Protein Requirements:  Current(68 kg)          Nutrition Related Findings:  Pt and daughter seen. Admit with weakness, viral pneumonia. Recent covid 1/22/21. States very poor appetite for ~ 1 week, improving. Declines any weight loss. Dislikes Ensure shakes. Agreeable to magic cup BID.  2 BM in the last 24 hours. 2/4/21 Sodium 131, Potassium 4.6, BUN 15, Creatinine 1, Glucose 135, Ca 8.1. Rx: zincate, vitamin C, vitamin D.       Wounds:  None       Current Nutrition Therapies:    DIET GENERAL; No Added Salt (3-4 GM)  Dietary Nutrition Supplements: Frozen Oral Supplement    Anthropometric Measures:  · Height: 5' 8\" (172.7 cm)  · Current Body Weight: 150 lb 12.8 oz (68.4 kg)(2/4/21 standing scale, no edema)   · Admission Body Weight: 153 lb 4.8 oz (69.5 kg)(bedscale, no edema)    · Usual Body Weight: (~ 150# per pt. No EMR weights in the last year.)     · Ideal Body Weight: 140 lbs;   · BMI: 22.9  · Adjusted Body Weight:  ; No Adjustment   · BMI Categories: Normal Weight (BMI 22.0 to 24.9) age over 72       Nutrition Diagnosis:   · Inadequate oral intake related to (poor appetite) as evidenced by poor intake prior to admission(and some meal intake less than 75%)      Nutrition Interventions:   Food and/or Nutrient Delivery:  Continue Current Diet, Modify Oral Nutrition Supplement  Nutrition Education/Counseling:  Education initiated(Encouraged oral intake, good protein sources, and ONS use.)   Coordination of Nutrition Care:  Continue to monitor while inpatient    Goals:  Pt will consume 75% or more of meals during LOS. Nutrition Monitoring and Evaluation:   Behavioral-Environmental Outcomes:  None Identified   Food/Nutrient Intake Outcomes:  Food and Nutrient Intake, Supplement Intake, Vitamin/Mineral Intake  Physical Signs/Symptoms Outcomes:  Biochemical Data, GI Status, Nutrition Focused Physical Findings, Skin, Weight     Discharge Planning:     Too soon to determine     Electronically signed by Mike Toro RD, LD on 2/4/21 at 3:57 PM EST    Contact: [phone]

## 2021-02-04 NOTE — PROGRESS NOTES
Hospitalist Progress Note    Patient:  Padmini Cortez      Unit/Bed:6K-24/024-A    YOB: 1931    MRN: [id number]       Acct: [de-identified]     PCP: Filippo Turner MD    Date of Admission: 2/2/2021    Assessment/Plan:    1. Acute hypoxic respiratory failure, resolved. Most probable secondary to recent COVID 19 1/22. PCT negative. Afebrile. Encourage IS/Acapella. Zinc, Vit D and Vit C.   2. Leukocytosis, resolved. PCT negative. BC pending. 3. Hyponatremia, mild. Improved with hydration. Stop IVF. 4. Weakness due to recent viral illness. PT/OT. 5. CKD stage III. Creatinine stable. 6. Essential hypertension. Resume ARB with holding parameters. 7. HX bladder CA. 8. Chronic diastolic heart failure. No overt signs of decompensation. Chief Complaint: weakness    Hospital Course:     66-year-old female with past medical history CKD, bladder cancer, hypertension who presented to the ED complaining of fatigue, weakness, dizziness. The patient states that she was diagnosed with Covid on 1/22/21. She states early this morning she woke up to use the restroom and became very weak, lightheaded and daughter helped her to the ground. The patient's daughter states that she was disoriented at that time. She is now AOx4, states she feels her weakness is improving. She reports mild dry cough. Denies fever/chills, shortness of breath, chest pain, abdominal pain, N/V/D, dysuria, urinary frequency/urgency. Patient is afebrile, vital signs stable. Labs show hyponatremia, mild leukocytosis. Patient admitted to hospitalist service for further management. Subjective (past 24 hours): States her SOB is improved. Mild LOUIS when walking outside the room. Oxygen has been weaned. She is still somewhat weak. Daughter requesting discharge tomorrow due to safety concerns with weakness.         Medications:  Reviewed    Infusion Medications    sodium chloride 100 mL/hr at 02/04/21 0125     Scheduled Medications    Vitamin D  1,000 Units Oral Daily    ascorbic acid  1,000 mg Oral Daily    zinc sulfate  50 mg Oral Daily    influenza virus vaccine  0.5 mL Intramuscular Prior to discharge    aspirin  81 mg Oral Daily    atenolol  25 mg Oral Daily    pravastatin  20 mg Oral Daily    sodium chloride flush  10 mL Intravenous 2 times per day    enoxaparin  30 mg Subcutaneous Q24H    losartan  100 mg Oral Daily     PRN Meds: cloNIDine, meclizine, sodium chloride flush, promethazine **OR** ondansetron, polyethylene glycol, acetaminophen **OR** acetaminophen, ipratropium-albuterol      Intake/Output Summary (Last 24 hours) at 2/4/2021 1351  Last data filed at 2/4/2021 1047  Gross per 24 hour   Intake 1774.99 ml   Output 0 ml   Net 1774.99 ml       Diet:  DIET GENERAL; No Added Salt (3-4 GM)  Dietary Nutrition Supplements: Low Volume Supplement    Exam:  BP (!) 114/55   Pulse 76   Temp 98.3 °F (36.8 °C) (Oral)   Resp 18   Ht 5' 8\" (1.727 m)   Wt 150 lb 12.8 oz (68.4 kg)   SpO2 91%   BMI 22.93 kg/m²     General appearance: No apparent distress, appears stated age and cooperative. HEENT: Pupils equal, round, and reactive to light. Conjunctivae/corneas clear. Neck: Supple, with full range of motion. No jugular venous distention. Trachea midline. Respiratory:  Normal respiratory effort. Crackles RLL  Cardiovascular: Regular rate and rhythm with normal S1/S2 without murmurs, rubs or gallops. Abdomen: Soft, non-tender, non-distended with normal bowel sounds. Musculoskeletal: passive and active ROM x 4 extremities. Skin: Skin color, texture, turgor normal.    Neurologic:  Neurovascularly intact without any focal sensory/motor deficits.  Cranial nerves: II-XII intact, grossly non-focal.  Psychiatric: Alert and oriented, thought content appropriate  Capillary Refill: Brisk,< 3 seconds   Peripheral Pulses: +2 palpable, equal bilaterally       Labs:   Recent Labs     02/02/21  0845 02/03/21  0306 02/04/21  0453 WBC 11.2* 9.1 9.0   HGB 14.0 11.1* 10.3*   HCT 42.0 34.2* 31.7*    279 270     Recent Labs     02/02/21  0845 02/02/21  0845 02/03/21  0306 02/03/21  0922 02/04/21  0453   *   < > 131* 131* 131*   K 4.4  --  4.5  --  4.6   CL 92*  --  101  --  103   CO2 27  --  22*  --  23   BUN 28*  --  22  --  15   CREATININE 1.3  --  0.9  --  1.0   CALCIUM  --   --  7.9*  --  8.1*    < > = values in this interval not displayed. Recent Labs     02/02/21  0845 02/03/21  0306   AST 20 11   ALT 37 17   BILITOT 0.9 0.5   ALKPHOS 64 43     No results for input(s): INR in the last 72 hours. Recent Labs     02/02/21  1053   TROPONINI < 0.017     Recent Labs     02/02/21  1748   PROCAL 0.09       Microbiology:      Urinalysis:      Lab Results   Component Value Date    NITRU NEGATIVE 02/02/2021    WBCUA 2-4 02/02/2021    WBCUA 50-75 09/23/2011    BACTERIA FEW 02/02/2021    RBCUA 0-2 02/02/2021    RBCUA 5-10 09/23/2011    BLOODU TRACE 02/02/2021    SPECGRAV 1.015 02/02/2021    GLUCOSEU neg 09/10/2014       Radiology:  Xr Chest Portable    Result Date: 2/2/2021  PROCEDURE: XR CHEST PORTABLE CLINICAL INFORMATION: short of breath . COMPARISON: 9/23/2011 TECHNIQUE: Portable upright FINDINGS: Heart size normal. Mediastinum is not widened. Peripheral bilateral lower lobe predominant opacities silhouette which have a nodular appearance. More patchy appearance at the lowest most portion of the lungs. No effusions. Pulmonary vessels are not congested. Numerous EKG leads overlie the upper chest. This decreases detail the right apex. Bilateral slightly lower lobe and peripheral predominant lung opacities likely acute infectious infiltrates. Some nodularity of the opacities such that the underlying lung nodules are not excluded. **This report has been created using voice recognition software. It may contain minor errors which are inherent in voice recognition technology. ** Final report electronically signed by Dr. Anselmo Pearson on 2/2/2021 9:38 AM      Code Status: Full Code      Active Hospital Problems    Diagnosis Date Noted    Viral pneumonia [J12.9] 02/02/2021    UTI (urinary tract infection) [N39.0] 02/02/2021       Electronically signed by SULEIMAN Zhao CNP on 2/4/2021 at 1:51 PM

## 2021-02-04 NOTE — PROGRESS NOTES
[address] [address]  INPATIENT PHYSICAL THERAPY  EVALUATION  [facility] RENAL TELEMETRY 6K - 4Q-27/097-I    Time In: 1430  Time Out: 1447  Timed Code Treatment Minutes: 9 Minutes  Minutes: 17          Date: 2021  Patient Name: Silvano Suresh,  Gender:  female        MRN: [id number]  : 7/15/1931  (80 y.o.)      Referring Practitioner: ROSALIND TODD  Diagnosis: Viral pneumonia  Additional Pertinent Hx: P:t admitted  with above. Pt had COVID test positive on . Pt had done fair with that ,however then pt on  became more ill. Pt was hyponatremic. Restrictions/Precautions:  Restrictions/Precautions: General Precautions    Subjective:  Chart Reviewed: Yes  Patient assessed for rehabilitation services?: Yes  Family / Caregiver Present: Yes  Subjective: Pt in bed, daughter present. Pt agreeable to PT    General:  Overall Orientation Status: Within Functional Limits  Follows Commands: Within Functional Limits    Vision: Within Functional Limits    Hearing: Within functional limits         Pain: 0/10:     Social/Functional History:    Lives With: Alone  Type of Home: House  Home Layout: Two level, Able to Live on Main level with bedroom/bathroom  Home Access: Ramped entrance(with 1 HR)  Home Equipment: Rolling walker             ADL Assistance: Independent  Homemaking Assistance: Independent  Ambulation Assistance: Independent  Transfer Assistance: Independent    Active : Yes  Mode of Transportation: Car     Additional Comments: Pt was not using AD prior to admission    OBJECTIVE:  Range of Motion:  Bilateral Lower Extremity: WFL    Strength:  Bilateral Lower Extremity: WFL    Balance:  Static Standing Balance: Supervision  Dynamic Standing Balance: Stand By Assistance    Bed Mobility:  Supine to Sit: Supervision  Scooting: Supervision    Transfers:  Sit to Stand: Supervision, Stand By Assistance  Stand to [address] [place] [address], Stand By Assistance    Ambulation:  Stand By Assistance, X 1  Distance: 150 feet  Surface: Level Tile  Device:No Device  Gait Deviations:  Slow Julia, Decreased Step Length Bilaterally, Decreased Arm Swing, Decreased Trunk Rotation, Decreased Gait Speed and steady      Exercise:  Patient was guided in 1 set(s) 10 reps of exercise to both lower extremities. Ankle pumps, Glut sets, Quad sets, Heelslides and Hip abduction/adduction. Exercises were completed for increased independence with functional mobility. Functional Outcome Measures: Completed  AM-PAC Inpatient Mobility Raw Score : 20  AM-PAC Inpatient T-Scale Score : 47.67    ASSESSMENT:  Activity Tolerance:  Patient tolerance of  treatment: good. Treatment Initiated: Treatment and education initiated within context of evaluation. Evaluation time included review of current medical information, gathering information related to past medical, social and functional history, completion of standardized testing, formal and informal observation of tasks, assessment of data and development of plan of care and goals. Treatment time included skilled education and facilitation of tasks to increase safety and independence with functional mobility for improved independence and quality of life. Assessment: Body structures, Functions, Activity limitations: Decreased functional mobility , Decreased endurance, Decreased strength  Assessment: Pt with generalized weakness, decreased functional mobility and endurance . Pt needs skilled therapy to  improve safety with mobility and return pt to prior level of function  Prognosis: Excellent    REQUIRES PT FOLLOW UP: Yes    Discharge Recommendations:  Discharge Recommendations: Home with Home health PT, Patient would benefit from continued therapy after discharge    Patient Education:  PT Education: Goals, PT Role, Gait Training, Transfer Training, Functional Mobility Training, Plan of Care, Home Exercise Program    Equipment Recommendations:  Equipment Needed: No    Plan:  Times per week: 3-5 X GM  Current Treatment Recommendations: Strengthening, Balance Training, Endurance Training, Functional Mobility Training, Transfer Training, Gait Training, Home Exercise Program    Goals:  Patient goals : Go home  Short term goals  Time Frame for Short term goals: by discharge  Short term goal 1: supine to sit and return with Mod i to get in and out of bed  Short term goal 2: sit to stand with Mod I to get on and off various surfaces  Short term goal 3: Pt will ambulate without  feet with S to walk safely in the community and the home  Short term goal 4: ambulate on ramp with 1 HR and CGA to enter home  Long term goals  Time Frame for Long term goals : NA due to short ELOS    Following session, patient left in safe position with all fall risk precautions in place.

## 2021-02-04 NOTE — FLOWSHEET NOTE
Pt was in bed at the time of the visit. Her children were with her. She was dealing with viral pneumonia. She was in great spirits and wanted prayer to heal. I prayed asking God to heal her. 02/04/21 1535   Encounter Summary   Services provided to: Patient and family together   Referral/Consult From: [address] [place], [place] [address] Visiting Yes  (2/4 )   Complexity of Encounter Moderate   Length of Encounter 15 minutes   Spiritual/Rastafari   Type Spiritual support   Assessment Approachable;Calm   Intervention Prayer;Nurtured hope; Active listening;Empowerment;Sustaining presence/ Ministry of presence   Outcome Connection/belonging;Expressed gratitude;Encouraged; Hopeful;Receptive

## 2021-02-04 NOTE — PLAN OF CARE
Problem: Nutrition  Goal: Optimal nutrition therapy  Outcome: Ongoing   Nutrition Problem #1: Inadequate oral intake  Intervention: Food and/or Nutrient Delivery: Continue Current Diet, Modify Oral Nutrition Supplement  Nutritional Goals: Pt will consume 75% or more of meals during LOS.

## 2021-02-04 NOTE — CARE COORDINATION
DISASTER CHARTING    2/4/21, 1:52 PM EST    DISCHARGE ONGOING EVALUATION:     [facility] day: 2  Location: [facility]23/815-Z Reason for admit: Viral pneumonia [J12.9]  UTI (urinary tract infection) [N39.0]   Barriers to Discharge: oxygen weaned off, up in chair, IVF stopped,  remains weak, Vitamin C, Vit D, Zinc, PT/OT ordered yesterday. PCP: Hussein Allen MD  Readmission Risk Score: 17%  Patient Goals/Plan/Treatment Preferences: from home alone; remains weak per family. Nursing staff plans walk patient in hallways and will monitor for home going needs.

## 2021-02-05 VITALS
RESPIRATION RATE: 18 BRPM | TEMPERATURE: 98.2 F | HEART RATE: 89 BPM | BODY MASS INDEX: 22.63 KG/M2 | HEIGHT: 68 IN | DIASTOLIC BLOOD PRESSURE: 56 MMHG | SYSTOLIC BLOOD PRESSURE: 117 MMHG | OXYGEN SATURATION: 91 % | WEIGHT: 149.3 LBS

## 2021-02-05 PROCEDURE — 6370000000 HC RX 637 (ALT 250 FOR IP): Performed by: PHYSICIAN ASSISTANT

## 2021-02-05 PROCEDURE — 6370000000 HC RX 637 (ALT 250 FOR IP): Performed by: NURSE PRACTITIONER

## 2021-02-05 PROCEDURE — 94760 N-INVAS EAR/PLS OXIMETRY 1: CPT

## 2021-02-05 PROCEDURE — 94669 MECHANICAL CHEST WALL OSCILL: CPT

## 2021-02-05 PROCEDURE — 99239 HOSP IP/OBS DSCHRG MGMT >30: CPT | Performed by: NURSE PRACTITIONER

## 2021-02-05 PROCEDURE — 2580000003 HC RX 258: Performed by: PHYSICIAN ASSISTANT

## 2021-02-05 RX ORDER — ALBUTEROL SULFATE 90 UG/1
2 AEROSOL, METERED RESPIRATORY (INHALATION) 4 TIMES DAILY PRN
Qty: 1 INHALER | Refills: 0 | Status: SHIPPED | OUTPATIENT
Start: 2021-02-05

## 2021-02-05 RX ADMIN — ASPIRIN 81 MG: 81 TABLET, CHEWABLE ORAL at 08:22

## 2021-02-05 RX ADMIN — Medication 50 MG: at 08:21

## 2021-02-05 RX ADMIN — SODIUM CHLORIDE, PRESERVATIVE FREE 10 ML: 5 INJECTION INTRAVENOUS at 08:21

## 2021-02-05 RX ADMIN — OXYCODONE HYDROCHLORIDE AND ACETAMINOPHEN 1000 MG: 500 TABLET ORAL at 08:21

## 2021-02-05 RX ADMIN — Medication 1000 UNITS: at 08:21

## 2021-02-05 NOTE — PROGRESS NOTES
CLINICAL PHARMACY: DISCHARGE MED RECONCILIATION/REVIEW    [facility]) Select Patient?: No  Total # of Interventions Recommended: 0   -   Total # Interventions Accepted: 0  Intervention Severity:   - Level 1 Intervention Present?: No   - Level 2 #: 0   - Level 3 #: 0   Time Spent (min): 15    Additional Documentation:    Lashae Alarcon PharmD 2/5/2021 12:02 PM

## 2021-02-05 NOTE — PROGRESS NOTES
Discharge teaching and instructions for diagnosis/procedure of Hyponatremia completed with patient using teachback method. AVS reviewed. Printed prescriptions given to patient. Patient voiced understanding regarding prescriptions, follow up appointments, and care of self at home.  Discharged in a wheelchair to  home with support per family

## 2021-02-05 NOTE — DISCHARGE SUMMARY
Hospital Medicine Discharge Summary      Patient Identification:   Eulogio Ellington   : 7/15/1931  MRN: [id number]   Account: [de-identified]      Patient's PCP: Marjorie Evans MD    Admit Date: 2021     Discharge Date:   2021    Admitting Physician: Alejandrina Becerra MD     Discharge Physician: Chris Park 135, APRN - CNP     Discharge Diagnoses and Hospital course:    Presented to the ED with weakness. Recently had COVID 19. Noted to be slightly hypoxic requiring 2L/NC. 1. Acute hypoxic respiratory failure, resolved. Most probable secondary to recent COVID 19 PNA . No signs of bacterial overlap. PCT negative. Afebrile. Encouraged IS/Acapella. She was given Zinc, Vit D and Vit C for immune support. Oxygen was weaned off. Albuterol inhaler on discharge PRN. 2. Leukocytosis, resolved. PCT negative. BC pending. 3. Hyponatremia, mild. Improved with hydration. 4. Weakness due to recent viral illness. PT/OT. Greatly improved. 5. CKD stage III. Creatinine stable. 6. Essential hypertension. Resume ARB with holding parameters. 7. HX bladder CA. 8. Chronic diastolic heart failure. No overt signs of decompensation.      Oxygen was weaned the AM of  and was not needed to be reapplied. SOB and LOUIS has resolved. She is ambulating well. She is declining home health services. Daughter and daughter in law will be helping on discharge. The patient was seen and examined on day of discharge and this discharge summary is in conjunction with any daily progress note from day of discharge.     Exam:     Vitals:  Vitals:    21 2339 21 0300 21 0800 21 0947   BP: (!) 118/55 (!) 103/52 (!) 103/52    Pulse: 96 102 92    Resp: 20 20 20    Temp: 98.2 °F (36.8 °C) 98 °F (36.7 °C) 98.3 °F (36.8 °C)    TempSrc: Oral Oral Oral    SpO2: 90% 90% 90% 91%   Weight:  149 lb 4.8 oz (67.7 kg)     Height:         Weight: Weight: 149 lb 4.8 oz (67.7 kg)     24 hour intake/output:    Intake/Output Summary (Last 24 hours) at 2/5/2021 1111  Last data filed at 2/5/2021 0300  Gross per 24 hour   Intake 1611.66 ml   Output 300 ml   Net 1311.66 ml         General appearance:  No apparent distress, appears stated age and cooperative. HEENT:  Normal cephalic, atraumatic without obvious deformity. Pupils equal, round, and reactive to light. Extra ocular muscles intact. Conjunctivae/corneas clear. Neck: Supple, with full range of motion. No jugular venous distention. Trachea midline. Respiratory:  Normal respiratory effort. Clear to auscultation, bilaterally without Rales/Wheezes/Rhonchi. Cardiovascular:  Regular rate and rhythm with normal S1/S2 without murmurs, rubs or gallops. Abdomen: Soft, non-tender, non-distended with normal bowel sounds. Musculoskeletal:  No clubbing, cyanosis or edema bilaterally. Full range of motion without deformity. Skin: Skin color, texture, turgor normal.  No rashes or lesions. Neurologic:  Neurovascularly intact without any focal sensory/motor deficits. Cranial nerves: II-XII intact, grossly non-focal.  Psychiatric:  Alert and oriented, thought content appropriate, normal insight  Capillary Refill: Brisk,< 3 seconds   Peripheral Pulses: +2 palpable, equal bilaterally       Labs: For convenience and continuity at follow-up the following most recent labs are provided:      CBC:    Lab Results   Component Value Date    WBC 9.0 02/04/2021    HGB 10.3 02/04/2021    HCT 31.7 02/04/2021     02/04/2021       Renal:    Lab Results   Component Value Date     02/04/2021    K 4.6 02/04/2021    K 4.5 02/03/2021     02/04/2021    CO2 23 02/04/2021    BUN 15 02/04/2021    CREATININE 1.0 02/04/2021    CALCIUM 8.1 02/04/2021         Significant Diagnostic Studies    Radiology:   XR CHEST PORTABLE   Final Result   Bilateral slightly lower lobe and peripheral predominant lung opacities likely acute infectious infiltrates.  Some nodularity of the opacities such that the underlying lung nodules are not excluded. **This report has been created using voice recognition software. It may contain minor errors which are inherent in voice recognition technology. **      Final report electronically signed by Dr. Rei Guerrero on 2/2/2021 9:38 AM             Consults:     IP CONSULT TO DIETITIAN    Disposition: Home  Condition at Discharge: Stable    Code Status:  Full Code     Patient Instructions: Activity: activity as tolerated  Diet: DIET GENERAL; No Added Salt (3-4 GM)  Dietary Nutrition Supplements: Frozen Oral Supplement      Follow-up visits:   PCP in 5-7 days. Discharge Medications:      [employer]   Home Medication Instructions DEBO:[id number]    Printed on:02/05/21 1111   Medication Information                      albuterol sulfate HFA (VENTOLIN HFA) 108 (90 Base) MCG/ACT inhaler  Inhale 2 puffs into the lungs 4 times daily as needed for Wheezing or Shortness of Breath             aspirin 81 MG tablet  Take 81 mg by mouth daily. atenolol (TENORMIN) 25 MG tablet  Take 25 mg by mouth daily. Calcium Carbonate (CALCIUM 600 PO)  Take by mouth             cloNIDine (CATAPRES) 0.1 MG tablet  Take 0.1 mg by mouth as needed. Coenzyme Q10 (CO Q 10) 100 MG CAPS  Take by mouth             GLUCOSAMINE SULFATE PO  Take 1,000 mg by mouth             losartan (COZAAR) 50 MG tablet  Take 100 mg by mouth daily              meclizine (ANTIVERT) 12.5 MG tablet  Take 25 mg by mouth 3 times daily as needed. pravastatin (PRAVACHOL) 40 MG tablet  Take 20 mg by mouth daily              therapeutic multivitamin-minerals (THERAGRAN-M) tablet  Take 1 tablet by mouth daily. Time Spent on discharge is more than 32 minutes in the examination, evaluation, counseling and review of medications and discharge plan. Signed:     Thank you Nicki Keene MD for the opportunity to be involved in this patient's care.     Electronically signed by SULEIMAN Giraldo CNP on 2/5/2021 at 11:11 AM

## 2021-02-05 NOTE — CARE COORDINATION
2/5/21, 12:08 PM EST    Discharge is ordered. Spoke with [place], she plans return to home alone but with much family support. Her granddaughter is a nurse practitioner and plans to check in on her frequently. She declines HH, denies all needs. Patient goals/plan/ treatment preferences discussed by  and . Patient goals/plan/ treatment preferences reviewed with patient/ family. Patient/ family verbalize understanding of discharge plan and are in agreement with goal/plan/treatment preferences. Understanding was demonstrated using the teach back method. AVS provided by RN at time of discharge, which includes all necessary medical information pertaining to the patients current course of illness, treatment, post-discharge goals of care, and treatment preferences.         IMM Letter  IMM Letter given to Patient/Family/Significant other/Guardian/POA/by[de-identified] mariella DRAPER  IMM Letter date given[de-identified] 02/05/21  IMM Letter time given[de-identified] 6693

## 2021-02-06 ENCOUNTER — CARE COORDINATION (OUTPATIENT)
Dept: CASE MANAGEMENT | Age: 86
End: 2021-02-06

## 2021-02-06 NOTE — CARE COORDINATION
Challenges to be reviewed by the provider   Additional needs identified to be addressed with provider No  none    Discussed COVID-19 related testing which was available at this time. Test results were positive. Patient informed of results, if available? aware         Method of communication with provider : none     Was this a readmission? No    Care Transition Nurse (CTN) contacted the patient by telephone to perform post hospital discharge assessment. Verified name and  with patient as identifiers. Provided introduction to self, and explanation of the CTN role. CTN reviewed discharge instructions, medical action plan and red flags with patient who verbalized understanding. Patient given an opportunity to ask questions and does not have any further questions or concerns at this time. Were discharge instructions available to patient? Yes. Reviewed appropriate site of care based on symptoms and resources available to patient including: PCP and When to call 911. The patient agrees to contact the PCP office for questions related to their healthcare. Medication reconciliation was performed with patient, who verbalizes understanding of administration of home medications. Advised obtaining a 90-day supply of all daily and as-needed medications. Covid Risk Education    Patient has following risk factors of: no known risk factors. Education provided regarding infection prevention, and signs and symptoms of COVID-19 and when to seek medical attention with patient who verbalized understanding. Discussed exposure protocols and quarantine From CDC: Are you at higher risk for severe illness?   and given an opportunity for questions and concerns. The patient agrees to contact the COVID-19 hotline 907-745-1973 or PCP office for questions related to COVID-19. For more information on steps you can take to protect yourself, see CDC's How to Protect Yourself     Discussed follow-up appointments.  If no The pt was at [facility] and received Cefdinir for the cold like s/s. He took his last dose this morning and the s/s still are present.  Andreas RONDA Pierce Jr. is a 52 year old male presenting with s/s of sinus congestion on the left side, with  post nasal drainage, and small amount of, thick and green nasal drainage. Patient states he is having a stuffy nose. .    Medication verified and med list updated  Denies known Latex allergy or symptoms of Latex sensitivity  Primary Care Physician verified        appointment was previously scheduled, appointment scheduling offered: Yes. Is follow up appointment scheduled within 7 days of discharge? Yes    Plan for follow-up call in 3-5 days based on severity of symptoms and risk factors. Plan for next call: symptom management-  and self management-   CTN provided contact information for future needs. Pt states she's doing well. Denies any ongoing SOB, fever, flu-like symptoms or other symptoms. States her appetite is good and she's getting around well with her walker. Reports taking medications as prescribed and denies questions or concerns at this time. Pt declined to review medications at this time as she is not in the same room as her paper right now.

## 2021-02-08 LAB
BLOOD CULTURE, ROUTINE: NORMAL
BLOOD CULTURE, ROUTINE: NORMAL

## 2021-02-10 ENCOUNTER — CARE COORDINATION (OUTPATIENT)
Dept: CARE COORDINATION | Age: 86
End: 2021-02-10

## 2021-02-10 NOTE — CARE COORDINATION
[facility] Transitions Follow Up Call    2/10/2021    Patient: Cary Cortez  Patient : 7/15/1931   MRN: <[id number]>  Reason for Admission: Viral Pneumonia, UTI, Moderate Malnutrition  Discharge Date: 21 RARS: Readmission Risk Score: 25         Spoke with: Patients daughter Vane Jimenez who reports that the patient is doing very well. Denies Pt having any cough, sob, or flu like symptoms. Pt's appetite has returned, pt ambulating without use of walker. Discussed /s of hyponatremia with Pts daughter who verbalized understanding. Pt has f/u with PCP scheduled on 21 @ 10:15 am. Pt's daughter denies any needs at this time. Care Transitions Subsequent and Final Call    Subsequent and Final Calls  Do you have any ongoing symptoms?: No  Have your medications changed?: No  Do you have any questions related to your medications?: No  Do you currently have any active services?: No  Do you have any needs or concerns that I can assist you with?: No  Identified Barriers: None  Care Transitions Interventions  No Identified Needs  Other Interventions: Follow Up  No future appointments. Kamala Jennings LPN    Patient contacted regarding COVID-19 risk and screening. Discussed COVID-19 related testing which was available at this time. Test results were positive. Patient informed of results, if available? Yes, test done 21    Care Transition Nurse/ Ambulatory Care Manager contacted the family by telephone to perform follow-up assessment. Verified name and  with family as identifiers. Patient has following risk factors of: pneumonia. Symptoms reviewed with family who verbalized the following symptoms: no symptoms at this time. Due to no new or worsening symptoms encounter was not routed to provider for escalation. Education provided regarding infection prevention, and signs and symptoms of COVID-19 and when to seek medical attention with family who verbalized understanding.  Discussed

## 2021-02-17 ENCOUNTER — CARE COORDINATION (OUTPATIENT)
Dept: CASE MANAGEMENT | Age: 86
End: 2021-02-17

## 2021-03-04 PROBLEM — N39.0 UTI (URINARY TRACT INFECTION): Status: RESOLVED | Noted: 2021-02-02 | Resolved: 2021-03-04

## 2022-01-04 PROBLEM — E44.0 MODERATE MALNUTRITION (HCC): Status: RESOLVED | Noted: 2021-02-04 | Resolved: 2022-01-04

## 2022-12-29 ENCOUNTER — HOSPITAL ENCOUNTER (EMERGENCY)
Age: 87
Discharge: HOME | End: 2022-12-29
Attending: EMERGENCY MEDICINE
Payer: MEDICARE

## 2022-12-29 VITALS
WEIGHT: 150 LBS | TEMPERATURE: 97.8 F | HEIGHT: 69 IN | HEART RATE: 84 BPM | SYSTOLIC BLOOD PRESSURE: 133 MMHG | DIASTOLIC BLOOD PRESSURE: 63 MMHG | BODY MASS INDEX: 22.22 KG/M2 | RESPIRATION RATE: 18 BRPM | OXYGEN SATURATION: 96 %

## 2022-12-29 DIAGNOSIS — N30.00 ACUTE CYSTITIS WITHOUT HEMATURIA: ICD-10-CM

## 2022-12-29 DIAGNOSIS — J10.1 INFLUENZA A: Primary | ICD-10-CM

## 2022-12-29 LAB
ALBUMIN SERPL-MCNC: 3.1 G/DL (ref 3.5–5.1)
ALP BLD-CCNC: 57 U/L (ref 38–126)
ALT SERPL-CCNC: 12 U/L (ref 11–66)
ANION GAP SERPL CALCULATED.3IONS-SCNC: 11 MEQ/L (ref 8–16)
AST SERPL-CCNC: 15 U/L (ref 5–40)
BACTERIA: ABNORMAL /HPF
BASOPHILS # BLD: 0.2 %
BASOPHILS ABSOLUTE: 0 THOU/MM3 (ref 0–0.1)
BILIRUB SERPL-MCNC: 0.3 MG/DL (ref 0.3–1.2)
BILIRUBIN URINE: NEGATIVE
BLOOD, URINE: NEGATIVE
BUN BLDV-MCNC: 10 MG/DL (ref 7–22)
CALCIUM SERPL-MCNC: 8.4 MG/DL (ref 8.5–10.5)
CASTS 2: ABNORMAL /LPF
CASTS UA: ABNORMAL /LPF
CHARACTER, URINE: CLEAR
CHLORIDE BLD-SCNC: 96 MEQ/L (ref 98–111)
CO2: 24 MEQ/L (ref 23–33)
COLOR: YELLOW
CREAT SERPL-MCNC: 1 MG/DL (ref 0.4–1.2)
CRYSTALS, UA: ABNORMAL
EKG ATRIAL RATE: 83 BPM
EKG P AXIS: 82 DEGREES
EKG P-R INTERVAL: 232 MS
EKG Q-T INTERVAL: 372 MS
EKG QRS DURATION: 102 MS
EKG QTC CALCULATION (BAZETT): 437 MS
EKG R AXIS: 62 DEGREES
EKG T AXIS: 79 DEGREES
EKG VENTRICULAR RATE: 83 BPM
EOSINOPHIL # BLD: 2.4 %
EOSINOPHILS ABSOLUTE: 0.2 THOU/MM3 (ref 0–0.4)
EPITHELIAL CELLS, UA: ABNORMAL /HPF
ERYTHROCYTE [DISTWIDTH] IN BLOOD BY AUTOMATED COUNT: 12.6 % (ref 11.5–14.5)
ERYTHROCYTE [DISTWIDTH] IN BLOOD BY AUTOMATED COUNT: 40.9 FL (ref 35–45)
GFR SERPL CREATININE-BSD FRML MDRD: 53 ML/MIN/1.73M2
GLUCOSE BLD-MCNC: 166 MG/DL (ref 70–108)
GLUCOSE URINE: NEGATIVE MG/DL
HCT VFR BLD CALC: 29.7 % (ref 37–47)
HEMOGLOBIN: 10.1 GM/DL (ref 12–16)
IMMATURE GRANS (ABS): 0.17 THOU/MM3 (ref 0–0.07)
IMMATURE GRANULOCYTES: 2.1 %
INFLUENZA A: DETECTED
INFLUENZA B: NOT DETECTED
KETONES, URINE: NEGATIVE
LEUKOCYTE ESTERASE, URINE: ABNORMAL
LYMPHOCYTES # BLD: 25.8 %
LYMPHOCYTES ABSOLUTE: 2.1 THOU/MM3 (ref 1–4.8)
MCH RBC QN AUTO: 30.3 PG (ref 26–33)
MCHC RBC AUTO-ENTMCNC: 34 GM/DL (ref 32.2–35.5)
MCV RBC AUTO: 89.2 FL (ref 81–99)
MISCELLANEOUS 2: ABNORMAL
MONOCYTES # BLD: 9.3 %
MONOCYTES ABSOLUTE: 0.8 THOU/MM3 (ref 0.4–1.3)
NITRITE, URINE: NEGATIVE
NUCLEATED RED BLOOD CELLS: 0 /100 WBC
OSMOLALITY CALCULATION: 265.5 MOSMOL/KG (ref 275–300)
PH UA: 7 (ref 5–9)
PLATELET # BLD: 346 THOU/MM3 (ref 130–400)
PMV BLD AUTO: 9.2 FL (ref 9.4–12.4)
POTASSIUM REFLEX MAGNESIUM: 4.4 MEQ/L (ref 3.5–5.2)
PROTEIN UA: NEGATIVE
RBC # BLD: 3.33 MILL/MM3 (ref 4.2–5.4)
RBC URINE: ABNORMAL /HPF
RENAL EPITHELIAL, UA: ABNORMAL
SARS-COV-2 RNA, RT PCR: NOT DETECTED
SEG NEUTROPHILS: 60.2 %
SEGMENTED NEUTROPHILS ABSOLUTE COUNT: 4.9 THOU/MM3 (ref 1.8–7.7)
SODIUM BLD-SCNC: 131 MEQ/L (ref 135–145)
SPECIFIC GRAVITY, URINE: 1 (ref 1–1.03)
TOTAL PROTEIN: 6.1 G/DL (ref 6.1–8)
TROPONIN T: < 0.01 NG/ML
UROBILINOGEN, URINE: 0.2 EU/DL (ref 0–1)
WBC # BLD: 8.1 THOU/MM3 (ref 4.8–10.8)
WBC UA: ABNORMAL /HPF
YEAST: ABNORMAL

## 2022-12-29 PROCEDURE — 93005 ELECTROCARDIOGRAM TRACING: CPT | Performed by: NURSE PRACTITIONER

## 2022-12-29 PROCEDURE — 81001 URINALYSIS AUTO W/SCOPE: CPT

## 2022-12-29 PROCEDURE — 85025 COMPLETE CBC W/AUTO DIFF WBC: CPT

## 2022-12-29 PROCEDURE — 87086 URINE CULTURE/COLONY COUNT: CPT

## 2022-12-29 PROCEDURE — 87636 SARSCOV2 & INF A&B AMP PRB: CPT

## 2022-12-29 PROCEDURE — 84484 ASSAY OF TROPONIN QUANT: CPT

## 2022-12-29 PROCEDURE — 36415 COLL VENOUS BLD VENIPUNCTURE: CPT

## 2022-12-29 PROCEDURE — 80053 COMPREHEN METABOLIC PANEL: CPT

## 2022-12-29 PROCEDURE — 6370000000 HC RX 637 (ALT 250 FOR IP): Performed by: EMERGENCY MEDICINE

## 2022-12-29 PROCEDURE — 99284 EMERGENCY DEPT VISIT MOD MDM: CPT

## 2022-12-29 RX ORDER — FLUTICASONE PROPIONATE 50 MCG
1 SPRAY, SUSPENSION (ML) NASAL DAILY
Qty: 32 G | Refills: 0 | Status: SHIPPED | OUTPATIENT
Start: 2022-12-29

## 2022-12-29 RX ORDER — GRANULES FOR ORAL 3 G/1
3 POWDER ORAL ONCE
Qty: 1 EACH | Refills: 0 | Status: SHIPPED | OUTPATIENT
Start: 2022-12-29 | End: 2022-12-29

## 2022-12-29 RX ORDER — OSELTAMIVIR PHOSPHATE 75 MG/1
75 CAPSULE ORAL 2 TIMES DAILY
Qty: 10 CAPSULE | Refills: 0 | Status: SHIPPED | OUTPATIENT
Start: 2022-12-29 | End: 2022-12-29 | Stop reason: SDUPTHER

## 2022-12-29 RX ORDER — GRANULES FOR ORAL 3 G/1
3 POWDER ORAL ONCE
Status: COMPLETED | OUTPATIENT
Start: 2022-12-29 | End: 2022-12-29

## 2022-12-29 RX ADMIN — GRANULES FOR ORAL SOLUTION 1 PACKET: 3 POWDER ORAL at 12:14

## 2022-12-29 ASSESSMENT — ENCOUNTER SYMPTOMS
EYE PAIN: 0
ABDOMINAL DISTENTION: 0
DIARRHEA: 0
RHINORRHEA: 1
SHORTNESS OF BREATH: 0
WHEEZING: 0
COUGH: 1
CONSTIPATION: 0
SORE THROAT: 0
COLOR CHANGE: 0
EYE DISCHARGE: 0
NAUSEA: 0
VOMITING: 0

## 2022-12-29 ASSESSMENT — PAIN - FUNCTIONAL ASSESSMENT: PAIN_FUNCTIONAL_ASSESSMENT: NONE - DENIES PAIN

## 2022-12-29 NOTE — ED NOTES
DC instructions, prescriptions and f/u care reviewed w/pt and daughter.       Lory Franco RN  12/29/22 8577

## 2022-12-29 NOTE — ED PROVIDER NOTES
[address] [address]          Pt Name: Taniya Abad  MRN: [id number]  [place] 7/15/1931  Date of evaluation: 12/29/2022  Treating Resident Physician: Jefe Lucia MD  Supervising Physician: Shaniqua Gomez MD    History obtained from the patient. CHIEF COMPLAINT       Chief Complaint   Patient presents with    Hypertension    Sinusitis    Incontinence           HISTORY OF PRESENT ILLNESS    HPI  Taniya Abad is a 80 y.o. female who presents to the emergency department for evaluation of sinus congestion, hypertension, and incontinence. Patient states she has had sinus congestion for the last 3 days and has been taking over-the-counter medications. Patient states that these medications have included Tylenol sinus. Patient also states that she has noticed that her blood pressure has been higher with blood pressure at 2 AM and greater than 280 systolic. Patient also states she has frequent UTIs and urinary frequency at baseline but has been having episodes of urinary incontinence for the last week. Patient denies any chest pain shortness of breath or syncope. The patient has no other acute complaints at this time. REVIEW OF SYSTEMS   Review of Systems   Constitutional:  Positive for fatigue. Negative for fever. HENT:  Positive for rhinorrhea. Negative for ear pain and sore throat. Eyes:  Negative for pain and discharge. Respiratory:  Positive for cough. Negative for shortness of breath and wheezing. Cardiovascular:  Negative for chest pain, palpitations and leg swelling. Gastrointestinal:  Negative for abdominal distention, constipation, diarrhea, nausea and vomiting. Endocrine: Negative for polydipsia and polyuria. Genitourinary:  Positive for urgency. Negative for difficulty urinating and dysuria. Urinary incontinence   Musculoskeletal:  Negative for arthralgias.    Skin:  Negative for color change, pallor and rash.   Neurological:  Negative for dizziness, seizures, syncope, weakness and numbness. Psychiatric/Behavioral:  Negative for agitation and confusion. PAST MEDICAL AND SURGICAL HISTORY     Past Medical History:   Diagnosis Date    Bladder cancer ([facility] [address] 2007    Chronic kidney disease, stage III (moderate) (HCC)     Diastolic dysfunction     Hyperlipidemia     Unspecified essential hypertension      Past Surgical History:   Procedure Laterality Date    JOINT REPLACEMENT Right     hip         MEDICATIONS   No current facility-administered medications for this encounter.     Current Outpatient Medications:     fosfomycin tromethamine (MONUROL) 3 g PACK, Take 1 packet by mouth once for 1 dose, Disp: 1 each, Rfl: 0    fluticasone (FLONASE) 50 MCG/ACT nasal spray, 1 spray by Each Nostril route daily, Disp: 32 g, Rfl: 0    Amoxicillin-Pot Clavulanate (AUGMENTIN PO), Take 875 mg by mouth in the morning and at bedtime x7 days, Disp: , Rfl:     losartan (COZAAR) 50 MG tablet, TAKE ONE TABLET BY MOUTH TWICE A DAY, Disp: 60 tablet, Rfl: 11    metoprolol succinate (TOPROL XL) 50 MG extended release tablet, TAKE ONE TABLET BY MOUTH TWICE A DAY, Disp: 180 tablet, Rfl: 3    cloNIDine (CATAPRES) 0.1 MG tablet, TAKE ONE TABLET BY MOUTH TWICE A DAY AS NEEDED AS DIRECTED, Disp: 30 tablet, Rfl: 0    meclizine (ANTIVERT) 25 MG tablet, TAKE ONE TABLET BY MOUTH EVERY 8 HOURS AS NEEDED DIZZINESS, Disp: 30 tablet, Rfl: 0    albuterol sulfate HFA (VENTOLIN HFA) 108 (90 Base) MCG/ACT inhaler, Inhale 2 puffs into the lungs 4 times daily as needed for Wheezing or Shortness of Breath (Patient not taking: Reported on 1/4/2022), Disp: 1 Inhaler, Rfl: 0    Coenzyme Q10 (CO Q 10) 100 MG CAPS, Take by mouth, Disp: , Rfl:     Calcium Carbonate (CALCIUM 600 PO), Take by mouth, Disp: , Rfl:     GLUCOSAMINE SULFATE PO, Take 1,000 mg by mouth (Patient not taking: Reported on 1/4/2022), Disp: , Rfl:     pravastatin (PRAVACHOL) 40 MG tablet, Take 20 mg by mouth daily , Disp: , Rfl:     atenolol (TENORMIN) 25 MG tablet, Take 25 mg by mouth daily. (Patient not taking: Reported on 1/4/2022), Disp: , Rfl:     aspirin 81 MG tablet, Take 81 mg by mouth daily. , Disp: , Rfl:     meclizine (ANTIVERT) 12.5 MG tablet, Take 25 mg by mouth 3 times daily as needed. (Patient not taking: Reported on 1/4/2022), Disp: , Rfl:     therapeutic multivitamin-minerals (THERAGRAN-M) tablet, Take 1 tablet by mouth daily. , Disp: , Rfl:       SOCIAL HISTORY     Social History     Social History Narrative    Not on file     Social History     Tobacco Use    Smoking status: Never    Smokeless tobacco: Never   Vaping Use    Vaping Use: Never used   Substance Use Topics    Alcohol use: Never     Alcohol/week: 0.0 standard drinks         ALLERGIES     Allergies   Allergen Reactions    Griseofulvin Ultramicrosize [Griseofulvin]     Macrobid [Nitrofurantoin]     Mobic [Meloxicam]     Morphine     Myrbetriq [Mirabegron]     Sulfa Antibiotics     Zocor [Simvastatin]          FAMILY HISTORY   History reviewed. No pertinent family history. PREVIOUS RECORDS   Previous records reviewed: today      PHYSICAL EXAM     ED Triage Vitals [12/29/22 1011]   BP Temp Temp Source Heart Rate Resp SpO2 Height Weight   (!) 157/75 97.8 °F (36.6 °C) Oral 84 18 96 % 5' 8.5\" (1.74 m) 150 lb (68 kg)     Initial vital signs and nursing assessment reviewed and abnormal from hypertension . Body mass index is 22.48 kg/m². Pulsoximetry is normal per my interpretation. Additional Vital Signs:  Vitals:    12/29/22 1025   BP: 133/63   Pulse:    Resp:    Temp:    SpO2:        Physical Exam  Constitutional:       Appearance: Normal appearance. HENT:      Head: Normocephalic. Right Ear: External ear normal.      Left Ear: External ear normal.      Nose: Congestion present. Mouth/Throat:      Mouth: Mucous membranes are moist.      Pharynx: Oropharynx is clear.    Eyes:      Extraocular Movements: Extraocular movements intact. Conjunctiva/sclera: Conjunctivae normal.      Pupils: Pupils are equal, round, and reactive to light. Cardiovascular:      Rate and Rhythm: Normal rate and regular rhythm. Pulses: Normal pulses. Heart sounds: Normal heart sounds. Pulmonary:      Effort: Pulmonary effort is normal.      Breath sounds: Normal breath sounds. Abdominal:      General: Bowel sounds are normal.      Palpations: Abdomen is soft. Musculoskeletal:         General: Normal range of motion. Cervical back: Normal range of motion and neck supple. Skin:     General: Skin is warm and dry. Capillary Refill: Capillary refill takes less than 2 seconds. Neurological:      General: No focal deficit present. Mental Status: She is alert and oriented to person, place, and time. Psychiatric:         Mood and Affect: Mood normal.         Behavior: Behavior normal.           MEDICAL DECISION MAKING   Initial Assessment:   Patient is a 80-year-old female with past medical history of hypertension and chronic kidney disease and bladder cancer who presents with complaint of sinus congestion, urinary incontinence, and hypertension. Pt differential dx includes but is not limited to UTI, cystitis, dehydration, URI, pneumonia, viral illness, hypertension, medication effect, hypertensive urgency, HTN emergency. Plan:   Labs  Viral swabs  EKG  Chest x-ray  Cardiac monitor    Pt is tolerating fluids well and appears in no acute distress. Pt positive for influenza A. Pt likely had elevated blood pressure at home because using over the counter agents for sinus complaints. Pt was given precautions with using over the counter medications. Pt will be treated for cystitis as well. Pt will be discharged home with precautions.                ED RESULTS   Laboratory results:  Labs Reviewed   COVID-19 & INFLUENZA COMBO - Abnormal; Notable for the following components:       Result Value    INFLUENZA A DETECTED (*)     All other components within normal limits   CBC WITH AUTO DIFFERENTIAL - Abnormal; Notable for the following components:    RBC 3.33 (*)     Hemoglobin 10.1 (*)     Hematocrit 29.7 (*)     MPV 9.2 (*)     Immature Grans (Abs) 0.17 (*)     All other components within normal limits   COMPREHENSIVE METABOLIC PANEL W/ REFLEX TO MG FOR LOW K - Abnormal; Notable for the following components:    Glucose 166 (*)     Sodium 131 (*)     Chloride 96 (*)     Calcium 8.4 (*)     Albumin 3.1 (*)     All other components within normal limits   URINE WITH REFLEXED MICRO - Abnormal; Notable for the following components:    Leukocyte Esterase, Urine LARGE (*)     All other components within normal limits   OSMOLALITY - Abnormal; Notable for the following components:    Osmolality Calc 265.5 (*)     All other components within normal limits   GLOMERULAR FILTRATION RATE, ESTIMATED - Abnormal; Notable for the following components:    Est, Glom Filt Rate 53 (*)     All other components within normal limits   CULTURE, REFLEXED, URINE    Narrative:     Source: Specimen not received       Site:           Current Antibiotics:   TROPONIN   ANION GAP       Radiologic studies results:  No orders to display       ED Medications administered this visit:   Medications   fosfomycin tromethamine (MONUROL) 3 g PACK 1 packet (1 packet Oral Given 12/29/22 1214)         ED COURSE     ED Course as of 12/29/22 1442   Thu Dec 29, 2022   1126 INFLUENZA A(!!): DETECTED [CN]      ED Course User Index  [CN] Dari Morales MD      Strict return precautions and follow up instructions were discussed with the patient prior to discharge, with which the patient agrees.       MEDICATION CHANGES     Discharge Medication List as of 12/29/2022 12:01 PM        START taking these medications    Details   fosfomycin tromethamine (MONUROL) 3 g PACK Take 1 packet by mouth once for 1 dose, Disp-1 each, R-0Normal      fluticasone (FLONASE) 50 MCG/ACT nasal spray 1 spray by Each Nostril route daily, Disp-32 g, R-0Normal               FINAL DISPOSITION     Final diagnoses:   Influenza A   Acute cystitis without hematuria     Condition: condition: good  Dispo: Discharge to home      This transcription was electronically signed. Parts of this transcriptions may have been dictated by use of voice recognition software and electronically transcribed, and parts may have been transcribed with the assistance of an ED scribe. The transcription may contain errors not detected in proofreading. Please refer to my supervising physician's documentation if my documentation differs.     Electronically Signed: Mirna Ortega MD, 12/29/22, 2:42 Sai De Los Santos MD  Resident  12/29/22 8530

## 2022-12-29 NOTE — ED PROVIDER NOTES
PATIENT NAME: Heather Ramires  MRN: [id number]  : 7/15/1931  [place]: 2022    I performed a history and physical examination of the patient and discussed management with the Resident. I reviewed the Resident's note and agree with the documented findings and plan of care. Any areas of disagreement are noted on the chart. I was personally present for the key portions of any procedures and have documented in the chart those procedures where I was not present during the key portions. I have reviewed the emergency nurses triage note and agree with the chief complaint, past medical history, past surgical history, allergies, medications, social and family history as documented unless otherwise noted below. MEDICAL DEDISION MAKING (MDM)     Heather Ramires is a 80 y.o. female who presents to Emergency Department with Hypertension, Sinusitis, and Incontinence     Incontinence when standing up for two days. Hx of frequent UTI. Sinus congestion and ear pain, subjective fever for last 3-4 days. Borderline elevated blood pressure after patient used over-the-counter decongestants    Exam: AVSS. Nontoxic looking. Cardiopulmonary exam unremarkable. Abdomen soft, no RUQ or RLQ tenderness. Mild tenderness to SP area. Normal active bowel sounds. No rashes from skin. Neurologically intact. ED work-ups: Influenza A positive, large leukocyte esterase, otherwise labs are reassuring. No indication for Tamiflu treatment. Patient's pyuria and cystitis will be treated with fosfomycin. For symptom control, she is prescribed Flonase. Discharged with PCP follow-up in 3 To 5 days.       Vitals:    22 1011 22 1025   BP: (!) 157/75 133/63   Pulse: 84    Resp: 18    Temp: 97.8 °F (36.6 °C)    TempSrc: Oral    SpO2: 96%    Weight: 150 lb (68 kg)    Height: 5' 8.5\" (1.74 m)      Medications   fosfomycin tromethamine (MONUROL) 3 g PACK 1 packet (has no administration in time range)     Labs Reviewed COVID-19 & INFLUENZA COMBO - Abnormal; Notable for the following components:       Result Value    INFLUENZA A DETECTED (*)     All other components within normal limits   CBC WITH AUTO DIFFERENTIAL - Abnormal; Notable for the following components:    RBC 3.33 (*)     Hemoglobin 10.1 (*)     Hematocrit 29.7 (*)     MPV 9.2 (*)     Immature Grans (Abs) 0.17 (*)     All other components within normal limits   COMPREHENSIVE METABOLIC PANEL W/ REFLEX TO MG FOR LOW K - Abnormal; Notable for the following components:    Glucose 166 (*)     Sodium 131 (*)     Chloride 96 (*)     Calcium 8.4 (*)     Albumin 3.1 (*)     All other components within normal limits   URINE WITH REFLEXED MICRO - Abnormal; Notable for the following components:    Leukocyte Esterase, Urine LARGE (*)     All other components within normal limits   OSMOLALITY - Abnormal; Notable for the following components:    Osmolality Calc 265.5 (*)     All other components within normal limits   GLOMERULAR FILTRATION RATE, ESTIMATED - Abnormal; Notable for the following components:    Est, Glom Filt Rate 53 (*)     All other components within normal limits   CULTURE, REFLEXED, URINE    Narrative:     Source: Specimen not received       Site:           Current Antibiotics:   TROPONIN   ANION GAP     No orders to display       FINAL IMPRESSION AND DISPOSITION      1. Influenza A    2.  Acute cystitis without hematuria        DISPOSITION Decision To Discharge 12/29/2022 11:44:05 AM      PATIENT REFERRED TO:  Elvi Patten MD  [address] [place] [address] [facility]  [phone]    Schedule an appointment as soon as possible for a visit in 2 days      DISCHARGE MEDICATIONS:  New Prescriptions    FLUTICASONE (FLONASE) 50 MCG/ACT NASAL SPRAY    1 spray by Each Nostril route daily    FOSFOMYCIN TROMETHAMINE (MONUROL) 3 G PACK    Take 1 packet by mouth once for 1 dose       (Please note that portions of this note were completed with a voice recognition program.  Efforts were made to edit the dictations but occasionally words aremis-transcribed.)    MD Dudley Mosquera MD  12/29/22 6909

## 2022-12-29 NOTE — ED NOTES
Bladder scan 130ml post void. Clean catch urine obtained and sent to lab.      Mike Lara RN  12/29/22 2953

## 2022-12-29 NOTE — ED TRIAGE NOTES
Pt to ED via private vehicle w/rprts of hypertension, sinus infection and incontinence of urine for the past two days. Rprts taking clonidine at 0100 this morning for BP in the highs 190's. BP improved but continues to have sinus pressure, cough and multiple episodes or urgency and incontinence. Denies all pains at this time. A&O x4. Breathing easy and unlabored on RA. Vitals updated.

## 2022-12-30 LAB
ORGANISM: ABNORMAL
URINE CULTURE REFLEX: ABNORMAL

## 2023-05-02 ENCOUNTER — APPOINTMENT (OUTPATIENT)
Dept: GENERAL RADIOLOGY | Age: 88
DRG: 641 | End: 2023-05-02
Payer: MEDICARE

## 2023-05-02 ENCOUNTER — HOSPITAL ENCOUNTER (INPATIENT)
Age: 88
LOS: 2 days | Discharge: HOME | DRG: 641 | End: 2023-05-04
Attending: STUDENT IN AN ORGANIZED HEALTH CARE EDUCATION/TRAINING PROGRAM | Admitting: STUDENT IN AN ORGANIZED HEALTH CARE EDUCATION/TRAINING PROGRAM
Payer: MEDICARE

## 2023-05-02 DIAGNOSIS — E87.1 HYPONATREMIA: ICD-10-CM

## 2023-05-02 DIAGNOSIS — I50.9 ACUTE CONGESTIVE HEART FAILURE, UNSPECIFIED HEART FAILURE TYPE (HCC): ICD-10-CM

## 2023-05-02 DIAGNOSIS — J18.9 PNEUMONIA OF BOTH LOWER LOBES DUE TO INFECTIOUS ORGANISM: Primary | ICD-10-CM

## 2023-05-02 LAB
ALBUMIN SERPL BCG-MCNC: 3.5 G/DL (ref 3.5–5.1)
ALP SERPL-CCNC: 70 U/L (ref 38–126)
ALT SERPL W/O P-5'-P-CCNC: 13 U/L (ref 11–66)
ANION GAP SERPL CALC-SCNC: 12 MEQ/L (ref 8–16)
AST SERPL-CCNC: 17 U/L (ref 5–40)
BACTERIA URNS QL MICRO: ABNORMAL /HPF
BASOPHILS ABSOLUTE: 0 THOU/MM3 (ref 0–0.1)
BASOPHILS NFR BLD AUTO: 0.1 %
BILIRUB CONJ SERPL-MCNC: < 0.2 MG/DL (ref 0–0.3)
BILIRUB SERPL-MCNC: 0.5 MG/DL (ref 0.3–1.2)
BILIRUB UR QL STRIP.AUTO: NEGATIVE
BUN SERPL-MCNC: 16 MG/DL (ref 7–22)
CALCIUM SERPL-MCNC: 8.8 MG/DL (ref 8.5–10.5)
CASTS #/AREA URNS LPF: ABNORMAL /LPF
CASTS 2: ABNORMAL /LPF
CHARACTER UR: CLEAR
CHLORIDE SERPL-SCNC: 92 MEQ/L (ref 98–111)
CO2 SERPL-SCNC: 23 MEQ/L (ref 23–33)
COLOR: YELLOW
CREAT SERPL-MCNC: 1 MG/DL (ref 0.4–1.2)
CRYSTALS URNS MICRO: ABNORMAL
DEPRECATED RDW RBC AUTO: 41.9 FL (ref 35–45)
EOSINOPHIL NFR BLD AUTO: 0 %
EOSINOPHILS ABSOLUTE: 0 THOU/MM3 (ref 0–0.4)
EPITHELIAL CELLS, UA: ABNORMAL /HPF
ERYTHROCYTE [DISTWIDTH] IN BLOOD BY AUTOMATED COUNT: 12.7 % (ref 11.5–14.5)
FLUAV RNA RESP QL NAA+PROBE: NOT DETECTED
FLUBV RNA RESP QL NAA+PROBE: NOT DETECTED
GFR SERPL CREATININE-BSD FRML MDRD: 53 ML/MIN/1.73M2
GLUCOSE SERPL-MCNC: 139 MG/DL (ref 70–108)
GLUCOSE UR QL STRIP.AUTO: NEGATIVE MG/DL
HCT VFR BLD AUTO: 32.3 % (ref 37–47)
HGB BLD-MCNC: 10.2 GM/DL (ref 12–16)
HGB UR QL STRIP.AUTO: NEGATIVE
IMM GRANULOCYTES # BLD AUTO: 0.14 THOU/MM3 (ref 0–0.07)
IMM GRANULOCYTES NFR BLD AUTO: 0.7 %
KETONES UR QL STRIP.AUTO: ABNORMAL
LYMPHOCYTES ABSOLUTE: 1.9 THOU/MM3 (ref 1–4.8)
LYMPHOCYTES NFR BLD AUTO: 9.2 %
MCH RBC QN AUTO: 28.4 PG (ref 26–33)
MCHC RBC AUTO-ENTMCNC: 31.6 GM/DL (ref 32.2–35.5)
MCV RBC AUTO: 90 FL (ref 81–99)
MISCELLANEOUS 2: ABNORMAL
MONOCYTES ABSOLUTE: 1.4 THOU/MM3 (ref 0.4–1.3)
MONOCYTES NFR BLD AUTO: 7 %
NEUTROPHILS NFR BLD AUTO: 83 %
NITRITE UR QL STRIP: NEGATIVE
NRBC BLD AUTO-RTO: 0 /100 WBC
NT-PROBNP SERPL IA-MCNC: 1633 PG/ML (ref 0–449)
OSMOLALITY SERPL CALC.SUM OF ELEC: 258.7 MOSMOL/KG (ref 275–300)
PH UR STRIP.AUTO: 7.5 [PH] (ref 5–9)
PLATELET # BLD AUTO: 280 THOU/MM3 (ref 130–400)
PMV BLD AUTO: 9.7 FL (ref 9.4–12.4)
POTASSIUM SERPL-SCNC: 4.6 MEQ/L (ref 3.5–5.2)
PROCALCITONIN SERPL IA-MCNC: 0.21 NG/ML (ref 0.01–0.09)
PROT SERPL-MCNC: 7.2 G/DL (ref 6.1–8)
PROT UR STRIP.AUTO-MCNC: ABNORMAL MG/DL
RBC # BLD AUTO: 3.59 MILL/MM3 (ref 4.2–5.4)
RBC URINE: ABNORMAL /HPF
RENAL EPI CELLS #/AREA URNS HPF: ABNORMAL /[HPF]
SARS-COV-2 RNA RESP QL NAA+PROBE: NOT DETECTED
SEGMENTED NEUTROPHILS ABSOLUTE COUNT: 16.8 THOU/MM3 (ref 1.8–7.7)
SODIUM SERPL-SCNC: 127 MEQ/L (ref 135–145)
SP GR UR REFRACT.AUTO: 1.01 (ref 1–1.03)
TROPONIN T: < 0.01 NG/ML
TSH SERPL DL<=0.005 MIU/L-ACNC: 1.84 UIU/ML (ref 0.4–4.2)
UROBILINOGEN, URINE: 0.2 EU/DL (ref 0–1)
WBC # BLD AUTO: 20.2 THOU/MM3 (ref 4.8–10.8)
WBC #/AREA URNS HPF: ABNORMAL /HPF
WBC #/AREA URNS HPF: ABNORMAL /[HPF]
YEAST LIKE FUNGI URNS QL MICRO: ABNORMAL

## 2023-05-02 PROCEDURE — 84145 PROCALCITONIN (PCT): CPT

## 2023-05-02 PROCEDURE — 84484 ASSAY OF TROPONIN QUANT: CPT

## 2023-05-02 PROCEDURE — 85025 COMPLETE CBC W/AUTO DIFF WBC: CPT

## 2023-05-02 PROCEDURE — 93005 ELECTROCARDIOGRAM TRACING: CPT | Performed by: STUDENT IN AN ORGANIZED HEALTH CARE EDUCATION/TRAINING PROGRAM

## 2023-05-02 PROCEDURE — 2580000003 HC RX 258: Performed by: STUDENT IN AN ORGANIZED HEALTH CARE EDUCATION/TRAINING PROGRAM

## 2023-05-02 PROCEDURE — 6360000002 HC RX W HCPCS: Performed by: STUDENT IN AN ORGANIZED HEALTH CARE EDUCATION/TRAINING PROGRAM

## 2023-05-02 PROCEDURE — 84443 ASSAY THYROID STIM HORMONE: CPT

## 2023-05-02 PROCEDURE — 99285 EMERGENCY DEPT VISIT HI MDM: CPT

## 2023-05-02 PROCEDURE — 96365 THER/PROPH/DIAG IV INF INIT: CPT

## 2023-05-02 PROCEDURE — 80053 COMPREHEN METABOLIC PANEL: CPT

## 2023-05-02 PROCEDURE — 36415 COLL VENOUS BLD VENIPUNCTURE: CPT

## 2023-05-02 PROCEDURE — 71046 X-RAY EXAM CHEST 2 VIEWS: CPT

## 2023-05-02 PROCEDURE — 87636 SARSCOV2 & INF A&B AMP PRB: CPT

## 2023-05-02 PROCEDURE — 87086 URINE CULTURE/COLONY COUNT: CPT

## 2023-05-02 PROCEDURE — 83880 ASSAY OF NATRIURETIC PEPTIDE: CPT

## 2023-05-02 PROCEDURE — 1200000003 HC TELEMETRY R&B

## 2023-05-02 PROCEDURE — 82248 BILIRUBIN DIRECT: CPT

## 2023-05-02 PROCEDURE — 87077 CULTURE AEROBIC IDENTIFY: CPT

## 2023-05-02 PROCEDURE — 87147 CULTURE TYPE IMMUNOLOGIC: CPT

## 2023-05-02 PROCEDURE — 81001 URINALYSIS AUTO W/SCOPE: CPT

## 2023-05-02 PROCEDURE — 96366 THER/PROPH/DIAG IV INF ADDON: CPT

## 2023-05-02 RX ORDER — PRAVASTATIN SODIUM 20 MG
20 TABLET ORAL DAILY
Status: DISCONTINUED | OUTPATIENT
Start: 2023-05-03 | End: 2023-05-03

## 2023-05-02 RX ORDER — ONDANSETRON 2 MG/ML
4 INJECTION INTRAMUSCULAR; INTRAVENOUS EVERY 6 HOURS PRN
Status: DISCONTINUED | OUTPATIENT
Start: 2023-05-02 | End: 2023-05-04 | Stop reason: HOSPADM

## 2023-05-02 RX ORDER — LOSARTAN POTASSIUM 50 MG/1
50 TABLET ORAL 2 TIMES DAILY
Status: DISCONTINUED | OUTPATIENT
Start: 2023-05-02 | End: 2023-05-04 | Stop reason: HOSPADM

## 2023-05-02 RX ORDER — POTASSIUM CHLORIDE 20 MEQ/1
40 TABLET, EXTENDED RELEASE ORAL PRN
Status: DISCONTINUED | OUTPATIENT
Start: 2023-05-02 | End: 2023-05-04 | Stop reason: HOSPADM

## 2023-05-02 RX ORDER — METOPROLOL SUCCINATE 50 MG/1
50 TABLET, EXTENDED RELEASE ORAL 2 TIMES DAILY
Status: DISCONTINUED | OUTPATIENT
Start: 2023-05-02 | End: 2023-05-04 | Stop reason: HOSPADM

## 2023-05-02 RX ORDER — SODIUM CHLORIDE 9 MG/ML
INJECTION, SOLUTION INTRAVENOUS CONTINUOUS
Status: DISCONTINUED | OUTPATIENT
Start: 2023-05-02 | End: 2023-05-02

## 2023-05-02 RX ORDER — HYDRALAZINE HYDROCHLORIDE 25 MG/1
25 TABLET, FILM COATED ORAL 3 TIMES DAILY
Status: DISCONTINUED | OUTPATIENT
Start: 2023-05-02 | End: 2023-05-04 | Stop reason: HOSPADM

## 2023-05-02 RX ORDER — POTASSIUM CHLORIDE 7.45 MG/ML
10 INJECTION INTRAVENOUS PRN
Status: DISCONTINUED | OUTPATIENT
Start: 2023-05-02 | End: 2023-05-04 | Stop reason: HOSPADM

## 2023-05-02 RX ORDER — POLYETHYLENE GLYCOL 3350 17 G/17G
17 POWDER, FOR SOLUTION ORAL DAILY PRN
Status: DISCONTINUED | OUTPATIENT
Start: 2023-05-02 | End: 2023-05-04 | Stop reason: HOSPADM

## 2023-05-02 RX ORDER — ONDANSETRON 4 MG/1
4 TABLET, ORALLY DISINTEGRATING ORAL EVERY 8 HOURS PRN
Status: DISCONTINUED | OUTPATIENT
Start: 2023-05-02 | End: 2023-05-04 | Stop reason: HOSPADM

## 2023-05-02 RX ORDER — ACETAMINOPHEN 325 MG/1
650 TABLET ORAL EVERY 6 HOURS PRN
Status: DISCONTINUED | OUTPATIENT
Start: 2023-05-02 | End: 2023-05-04 | Stop reason: HOSPADM

## 2023-05-02 RX ORDER — FLUTICASONE PROPIONATE 50 MCG
1 SPRAY, SUSPENSION (ML) NASAL DAILY
Status: DISCONTINUED | OUTPATIENT
Start: 2023-05-03 | End: 2023-05-04 | Stop reason: HOSPADM

## 2023-05-02 RX ORDER — ACETAMINOPHEN 650 MG/1
650 SUPPOSITORY RECTAL EVERY 6 HOURS PRN
Status: DISCONTINUED | OUTPATIENT
Start: 2023-05-02 | End: 2023-05-04 | Stop reason: HOSPADM

## 2023-05-02 RX ORDER — SODIUM CHLORIDE 9 MG/ML
INJECTION, SOLUTION INTRAVENOUS PRN
Status: DISCONTINUED | OUTPATIENT
Start: 2023-05-02 | End: 2023-05-04 | Stop reason: HOSPADM

## 2023-05-02 RX ORDER — ENOXAPARIN SODIUM 100 MG/ML
40 INJECTION SUBCUTANEOUS DAILY
Status: DISCONTINUED | OUTPATIENT
Start: 2023-05-03 | End: 2023-05-04 | Stop reason: HOSPADM

## 2023-05-02 RX ORDER — SODIUM CHLORIDE 0.9 % (FLUSH) 0.9 %
5-40 SYRINGE (ML) INJECTION EVERY 12 HOURS SCHEDULED
Status: DISCONTINUED | OUTPATIENT
Start: 2023-05-02 | End: 2023-05-04 | Stop reason: HOSPADM

## 2023-05-02 RX ORDER — CLONIDINE HYDROCHLORIDE 0.1 MG/1
0.1 TABLET ORAL 2 TIMES DAILY
Status: DISCONTINUED | OUTPATIENT
Start: 2023-05-02 | End: 2023-05-04 | Stop reason: HOSPADM

## 2023-05-02 RX ORDER — ASPIRIN 81 MG/1
81 TABLET, CHEWABLE ORAL DAILY
Status: DISCONTINUED | OUTPATIENT
Start: 2023-05-03 | End: 2023-05-04 | Stop reason: HOSPADM

## 2023-05-02 RX ORDER — IPRATROPIUM BROMIDE 42 UG/1
2 SPRAY, METERED NASAL 4 TIMES DAILY
Status: DISCONTINUED | OUTPATIENT
Start: 2023-05-02 | End: 2023-05-02 | Stop reason: ALTCHOICE

## 2023-05-02 RX ORDER — SODIUM CHLORIDE 0.9 % (FLUSH) 0.9 %
5-40 SYRINGE (ML) INJECTION PRN
Status: DISCONTINUED | OUTPATIENT
Start: 2023-05-02 | End: 2023-05-04 | Stop reason: HOSPADM

## 2023-05-02 RX ORDER — MAGNESIUM SULFATE IN WATER 40 MG/ML
2000 INJECTION, SOLUTION INTRAVENOUS PRN
Status: DISCONTINUED | OUTPATIENT
Start: 2023-05-02 | End: 2023-05-04 | Stop reason: HOSPADM

## 2023-05-02 RX ADMIN — CEFTRIAXONE SODIUM 1000 MG: 1 INJECTION, POWDER, FOR SOLUTION INTRAMUSCULAR; INTRAVENOUS at 19:20

## 2023-05-02 RX ADMIN — AZITHROMYCIN 500 MG: 500 INJECTION, POWDER, LYOPHILIZED, FOR SOLUTION INTRAVENOUS at 20:56

## 2023-05-02 ASSESSMENT — ENCOUNTER SYMPTOMS
SHORTNESS OF BREATH: 0
ALLERGIC/IMMUNOLOGIC NEGATIVE: 1
CONSTIPATION: 0
CHEST TIGHTNESS: 0
COUGH: 0
VOMITING: 0
NAUSEA: 0
EYES NEGATIVE: 1
ABDOMINAL DISTENTION: 0
ABDOMINAL PAIN: 0
RHINORRHEA: 0
APNEA: 0
DIARRHEA: 0

## 2023-05-02 NOTE — ED TRIAGE NOTES
Pt to er. Pt c/o weakness and hypertension. States BP was 200s last night and saw family doctor today and got meds changed. States this afternoon she started feeing really weak. Denies any cough, fever, urinary symptoms.

## 2023-05-02 NOTE — ED NOTES
Pt resting in bed. Resp regular. External cath placed. Denies other needs. Call light in reach.       Lito Pearson RN  05/02/23 8390

## 2023-05-03 LAB
ANION GAP SERPL CALC-SCNC: 12 MEQ/L (ref 8–16)
ANION GAP SERPL CALC-SCNC: 12 MEQ/L (ref 8–16)
ANION GAP SERPL CALC-SCNC: 9 MEQ/L (ref 8–16)
BASOPHILS ABSOLUTE: 0 THOU/MM3 (ref 0–0.1)
BASOPHILS NFR BLD AUTO: 0.2 %
BUN SERPL-MCNC: 13 MG/DL (ref 7–22)
BUN SERPL-MCNC: 14 MG/DL (ref 7–22)
BUN SERPL-MCNC: 19 MG/DL (ref 7–22)
CALCIUM SERPL-MCNC: 8.5 MG/DL (ref 8.5–10.5)
CALCIUM SERPL-MCNC: 8.6 MG/DL (ref 8.5–10.5)
CALCIUM SERPL-MCNC: 8.6 MG/DL (ref 8.5–10.5)
CHLORIDE SERPL-SCNC: 93 MEQ/L (ref 98–111)
CHLORIDE SERPL-SCNC: 95 MEQ/L (ref 98–111)
CHLORIDE SERPL-SCNC: 96 MEQ/L (ref 98–111)
CO2 SERPL-SCNC: 24 MEQ/L (ref 23–33)
CO2 SERPL-SCNC: 25 MEQ/L (ref 23–33)
CO2 SERPL-SCNC: 25 MEQ/L (ref 23–33)
CREAT SERPL-MCNC: 1 MG/DL (ref 0.4–1.2)
CREAT SERPL-MCNC: 1 MG/DL (ref 0.4–1.2)
CREAT SERPL-MCNC: 1.1 MG/DL (ref 0.4–1.2)
DEPRECATED RDW RBC AUTO: 41.6 FL (ref 35–45)
EKG ATRIAL RATE: 90 BPM
EKG P AXIS: 79 DEGREES
EKG P-R INTERVAL: 210 MS
EKG Q-T INTERVAL: 342 MS
EKG QRS DURATION: 94 MS
EKG QTC CALCULATION (BAZETT): 418 MS
EKG R AXIS: 36 DEGREES
EKG T AXIS: 73 DEGREES
EKG VENTRICULAR RATE: 90 BPM
EOSINOPHIL NFR BLD AUTO: 0.1 %
EOSINOPHILS ABSOLUTE: 0 THOU/MM3 (ref 0–0.4)
ERYTHROCYTE [DISTWIDTH] IN BLOOD BY AUTOMATED COUNT: 12.9 % (ref 11.5–14.5)
GFR SERPL CREATININE-BSD FRML MDRD: 47 ML/MIN/1.73M2
GFR SERPL CREATININE-BSD FRML MDRD: 53 ML/MIN/1.73M2
GFR SERPL CREATININE-BSD FRML MDRD: 53 ML/MIN/1.73M2
GLUCOSE SERPL-MCNC: 122 MG/DL (ref 70–108)
GLUCOSE SERPL-MCNC: 128 MG/DL (ref 70–108)
GLUCOSE SERPL-MCNC: 130 MG/DL (ref 70–108)
HCT VFR BLD AUTO: 28.5 % (ref 37–47)
HGB BLD-MCNC: 9.3 GM/DL (ref 12–16)
IMM GRANULOCYTES # BLD AUTO: 0.09 THOU/MM3 (ref 0–0.07)
IMM GRANULOCYTES NFR BLD AUTO: 0.6 %
LACTIC ACID, SEPSIS: 0.7 MMOL/L (ref 0.5–1.9)
LACTIC ACID, SEPSIS: 0.9 MMOL/L (ref 0.5–1.9)
LV EF: 60 %
LVEF MODALITY: NORMAL
LYMPHOCYTES ABSOLUTE: 2.3 THOU/MM3 (ref 1–4.8)
LYMPHOCYTES NFR BLD AUTO: 14.2 %
MCH RBC QN AUTO: 28.8 PG (ref 26–33)
MCHC RBC AUTO-ENTMCNC: 32.6 GM/DL (ref 32.2–35.5)
MCV RBC AUTO: 88.2 FL (ref 81–99)
MONOCYTES ABSOLUTE: 1.3 THOU/MM3 (ref 0.4–1.3)
MONOCYTES NFR BLD AUTO: 7.9 %
NEUTROPHILS NFR BLD AUTO: 77 %
NRBC BLD AUTO-RTO: 0 /100 WBC
PLATELET # BLD AUTO: 251 THOU/MM3 (ref 130–400)
PMV BLD AUTO: 9.7 FL (ref 9.4–12.4)
POTASSIUM SERPL-SCNC: 4.1 MEQ/L (ref 3.5–5.2)
POTASSIUM SERPL-SCNC: 4.3 MEQ/L (ref 3.5–5.2)
POTASSIUM SERPL-SCNC: 4.4 MEQ/L (ref 3.5–5.2)
RBC # BLD AUTO: 3.23 MILL/MM3 (ref 4.2–5.4)
SEGMENTED NEUTROPHILS ABSOLUTE COUNT: 12.6 THOU/MM3 (ref 1.8–7.7)
SODIUM SERPL-SCNC: 127 MEQ/L (ref 135–145)
SODIUM SERPL-SCNC: 132 MEQ/L (ref 135–145)
SODIUM SERPL-SCNC: 132 MEQ/L (ref 135–145)
WBC # BLD AUTO: 16.3 THOU/MM3 (ref 4.8–10.8)

## 2023-05-03 PROCEDURE — 87040 BLOOD CULTURE FOR BACTERIA: CPT

## 2023-05-03 PROCEDURE — 6360000002 HC RX W HCPCS: Performed by: PHYSICIAN ASSISTANT

## 2023-05-03 PROCEDURE — 85025 COMPLETE CBC W/AUTO DIFF WBC: CPT

## 2023-05-03 PROCEDURE — 2580000003 HC RX 258: Performed by: PHYSICIAN ASSISTANT

## 2023-05-03 PROCEDURE — 6370000000 HC RX 637 (ALT 250 FOR IP): Performed by: PHYSICIAN ASSISTANT

## 2023-05-03 PROCEDURE — 99223 1ST HOSP IP/OBS HIGH 75: CPT | Performed by: PHYSICIAN ASSISTANT

## 2023-05-03 PROCEDURE — 93306 TTE W/DOPPLER COMPLETE: CPT

## 2023-05-03 PROCEDURE — 80048 BASIC METABOLIC PNL TOTAL CA: CPT

## 2023-05-03 PROCEDURE — 93010 ELECTROCARDIOGRAM REPORT: CPT | Performed by: NUCLEAR MEDICINE

## 2023-05-03 PROCEDURE — 1200000003 HC TELEMETRY R&B

## 2023-05-03 PROCEDURE — 83605 ASSAY OF LACTIC ACID: CPT

## 2023-05-03 PROCEDURE — 36415 COLL VENOUS BLD VENIPUNCTURE: CPT

## 2023-05-03 RX ADMIN — SODIUM CHLORIDE, PRESERVATIVE FREE 10 ML: 5 INJECTION INTRAVENOUS at 09:17

## 2023-05-03 RX ADMIN — CLONIDINE HYDROCHLORIDE 0.1 MG: 0.1 TABLET ORAL at 00:00

## 2023-05-03 RX ADMIN — ENOXAPARIN SODIUM 40 MG: 100 INJECTION SUBCUTANEOUS at 09:13

## 2023-05-03 RX ADMIN — METOPROLOL SUCCINATE 50 MG: 50 TABLET, EXTENDED RELEASE ORAL at 20:06

## 2023-05-03 RX ADMIN — METOPROLOL SUCCINATE 50 MG: 50 TABLET, EXTENDED RELEASE ORAL at 00:00

## 2023-05-03 RX ADMIN — SODIUM CHLORIDE, PRESERVATIVE FREE 10 ML: 5 INJECTION INTRAVENOUS at 00:00

## 2023-05-03 RX ADMIN — HYDRALAZINE HYDROCHLORIDE 25 MG: 25 TABLET, FILM COATED ORAL at 00:00

## 2023-05-03 RX ADMIN — LOSARTAN POTASSIUM 50 MG: 50 TABLET, FILM COATED ORAL at 00:00

## 2023-05-03 RX ADMIN — HYDRALAZINE HYDROCHLORIDE 25 MG: 25 TABLET, FILM COATED ORAL at 09:09

## 2023-05-03 RX ADMIN — ASPIRIN 81 MG 81 MG: 81 TABLET ORAL at 09:12

## 2023-05-03 RX ADMIN — CLONIDINE HYDROCHLORIDE 0.1 MG: 0.1 TABLET ORAL at 09:09

## 2023-05-03 RX ADMIN — LOSARTAN POTASSIUM 50 MG: 50 TABLET, FILM COATED ORAL at 09:09

## 2023-05-03 RX ADMIN — SODIUM CHLORIDE, PRESERVATIVE FREE 10 ML: 5 INJECTION INTRAVENOUS at 20:06

## 2023-05-03 RX ADMIN — METOPROLOL SUCCINATE 50 MG: 50 TABLET, EXTENDED RELEASE ORAL at 09:09

## 2023-05-03 RX ADMIN — CLONIDINE HYDROCHLORIDE 0.1 MG: 0.1 TABLET ORAL at 20:07

## 2023-05-03 RX ADMIN — LOSARTAN POTASSIUM 50 MG: 50 TABLET, FILM COATED ORAL at 20:06

## 2023-05-03 RX ADMIN — HYDRALAZINE HYDROCHLORIDE 25 MG: 25 TABLET, FILM COATED ORAL at 20:06

## 2023-05-03 ASSESSMENT — ENCOUNTER SYMPTOMS
NAUSEA: 0
CHEST TIGHTNESS: 0
CONSTIPATION: 0
VOMITING: 0
SHORTNESS OF BREATH: 0
APNEA: 0
RHINORRHEA: 0
EYES NEGATIVE: 1
ALLERGIC/IMMUNOLOGIC NEGATIVE: 1
COUGH: 0
ABDOMINAL DISTENTION: 0
DIARRHEA: 0
ABDOMINAL PAIN: 0

## 2023-05-03 NOTE — PROGRESS NOTES
Pt was with family at the time of the visit. She was dealing with hyponatremia but was hopeful. She was anointed.     05/03/23 1711   Encounter Summary   Encounter Overview/Reason  Initial Encounter   Service Provided For: Patient and family together   Referral/Consult From: [facility]   Last Encounter  05/03/23  (Anointed)   Complexity of Encounter Moderate   Spiritual/Emotional needs   Type Spiritual Support   Rituals, Rites and Sacraments   Type Anointing   Assessment/Intervention/Outcome   Assessment Hopeful   Intervention Empowerment

## 2023-05-03 NOTE — ED NOTES
Pt resting in bed. Resp regular. Denies all needs. Family at side. Call light in reach.      Vandana Hanson RN  05/02/23 2100

## 2023-05-03 NOTE — PLAN OF CARE
Patient admitted after midnight for generalized weakness. Noted to have bilateral infiltrates on CXR, however I reviewed imaging and it looks improved compared to study from 2/2021. The patient does have leukocytosis (that is improving) and was subsequently started on antibiotic therapy. She is afebrile. The patient reports feeling much better this AM.  She is up ambulating. Patient does not meet criteria for sepsis. Denies fever, cough, shortness of breath. Denies nausea vomiting, abdominal pain. No urinary symptoms, there for patient does not meet criteria for antibiotic therapy. Will discontinue at this time. Echocardiogram pending. Tolerating PO intake.      Electronically signed by SULEIMAN Griffin CNP on 5/3/2023 at 1:58 PM

## 2023-05-03 NOTE — H&P
Hospitalist - History & Physical      Patient: Micheal Cortez    Unit/Bed:8A14/014-A  YOB: 1931  MRN: [id number]   Acct: [de-identified]   PCP: Alondra De Oliveira MD    Assessment and Plan:        Pneumonia, possible Sepsis:   New onset, imaging and lab values suggestive of pneumonia  Azithromycin and ceftriaxone given in ED will continue every 24 hours  Elevated Pro-BNP:  New onset  Due to concurrent BLE edema and history of HTN, suspicious for CHF with a history of diastolic dysfunction  Ordered Echocardiogram  Ordered Procalcitonin  Currently taking metoprolol and losartan for HTN. Hyponatremia:  New onset  Possibly due to dehydration. Start IV hydration, slowly and limited due to CKD and possible CHF. Weakness:   New onset  Likely multifactorial due to #1, #2, and possibly #3  Treating underlying causes. Chronic Kidney Disease, Stage 3a:   Chronic disease, stable  eGFR stable at 53 and creatinine stable at 1.0  Avoid nephrotoxic drugs. Continue hydration due to hyponatremia. Serial BMPs to monitor kidney function. Normocytic Anemia:   New onset  Possibly due to CKD  Will repeat CBC to monitor anemia  Hypertension:   Chronic disease, stable but still elevated  Significantly elevated over the last 24 hours. Managed currently with losartan and metoprolol. Started on hydralazine today. Continue medication regiment during stay. Vertigo:   Chronic disease, stable  Managed with meclizine and lifestyle modifications. Continue medication regiment during stay. Insomnia:  Chronic problem  Could be contributing factor to #4. Managed with clonidine. Continue medication regiment during stay. CC:  Weakness    HPI: India Dixon is a 80 y.o. female with a history of CKD, vertigo, insomnia and bladder cancer, who presented to the ER complaining of weakness today. The patient visited her PCP today for weakness and hypertension and an order was placed for a holter monitor and echo.  She

## 2023-05-03 NOTE — ED PROVIDER NOTES
[facility]      EMERGENCY MEDICINE     Pt Name: Bruce Garcia  MRN: [id number]  Gustabogfvalerio 7/15/1931  Date of evaluation: 5/2/2023  Provider: Braeden Nicole MD    CHIEF COMPLAINT       Chief Complaint   Patient presents with    Fatigue    Hypertension     HISTORY OF PRESENT ILLNESS   Vasyl Cortez is a pleasant 80 y.o. female who presents to the emergency department from from home, by private vehicle for evaluation of generalized weakness and fatigue. Symptoms have been ongoing for the last 24 to 48 hours. She noted an elevated blood pressure at home at 200/100. She was evaluated by her primary care physician and a new antihypertensive was prescribed. She denies chest pain or shortness of breath. She denies abdominal pain, nausea, vomiting. She denies lower extremity edema/erythema/pain. Denies headache or vision changes. Denies focal neurological deficits.   She is accompanied by her daughters    PASTMEDICAL HISTORY     Past Medical History:   Diagnosis Date    Bladder cancer ([facility] [address] 2007    Chronic kidney disease, stage III (moderate) (HCC)     Diastolic dysfunction     Hyperlipidemia     Unspecified essential hypertension        Patient Active Problem List   Diagnosis Code    Essential hypertension I10    Hyperlipidemia E78.5    Chronic kidney disease, stage III (moderate) ([address] N18.30    Bladder cancer ([facility] [address] X30.4    Diastolic dysfunction A48.31    Viral pneumonia J12.9    Hyponatremia E87.1     SURGICAL HISTORY       Past Surgical History:   Procedure Laterality Date    JOINT REPLACEMENT Right     hip       CURRENT MEDICATIONS       Current Discharge Medication List        CONTINUE these medications which have NOT CHANGED    Details   hydrALAZINE (APRESOLINE) 25 MG tablet Take 1 tablet by mouth 3 times daily  Qty: 90 tablet, Refills: 3    Associated Diagnoses: Essential hypertension      cloNIDine (CATAPRES) 0.1 MG tablet Take 1 tablet by mouth 2 times daily Taking BID PRN  Qty: 180

## 2023-05-03 NOTE — CARE COORDINATION
Case Management Assessment  Initial Evaluation    Date/Time of Evaluation: 5/3/2023 2:15 PM  Assessment Completed by: Stanley Resendiz RN    If patient is discharged prior to next notation, then this note serves as note for discharge by case management. Patient Name: Alyssia Acosta                   YOB: 1931  Diagnosis: Hyponatremia [E87.1]                   Date / Time: 5/2/2023  5:06 PM  [facility]     Patient Admission Status: Inpatient   Readmission Risk Low 0-14, Mod 15-19), High > 20: Readmission Risk Score: 13.3    Current PCP: Pricilla Allen MD  PCP verified by CM? Yes    Chart Reviewed: Yes      History Provided by: Patient  Patient Orientation: Alert and Oriented    Patient Cognition: Alert    Hospitalization in the last 30 days (Readmission):  No    If yes, Readmission Assessment in  Navigator will be completed. Advance Directives:      Code Status: Full Code   Patient's Primary Decision Maker is: Legal Next of Kin      Discharge Planning:    Patient lives with: Alone Type of Home: House  Primary Care Giver: Self  Patient Support Systems include: Children, Family Members   Current Financial resources: Medicare  Current community resources: None  Current services prior to admission: None            Current DME:              Type of Home Care services:  None    ADLS  Prior functional level: Independent in ADLs/IADLs  Current functional level: Independent in ADLs/IADLs    Family can provide assistance at DC: Yes  Would you like Case Management to discuss the discharge plan with any other family members/significant others, and if so, who?  Yes (family at bedside)  Plans to Return to Present Housing: Yes  Other Identified Issues/Barriers to RETURNING to current housing: no  Potential Assistance needed at discharge: N/A            Potential DME:    Patient expects to discharge to: [address] [place] [address] for transportation at discharge: Self    Financial    Payor: Marixa Sutton / Plan:

## 2023-05-03 NOTE — H&P
Hospitalist - History & Physical      Patient: Alex Cortez    Unit/Bed:DORA /DORA  YOB: 1931  MRN: [id number]   Acct: [de-identified]   PCP: Vikram Conway MD    Assessment and Plan:        Pneumonia, possible Sepsis:   New onset, imaging and lab values suggestive of pneumonia  Unknown etiology  Azithromycin and ceftriaxone given in ED. Continue antibiotics upon admission. Elevated Pro-BNP:  New onset  Due to concurrent BLE edema and history of HTN, suspicious for CHF. Ordered Echocardiogram  Ordered Procalcitonin  Currently taking metoprolol and losartan for HTN. Hyponatremia:  New onset  Possibly due to dehydration. Start IV hydration, slowly and limited due to CKD and possible CHF. Weakness:   New onset  Likely multifactorial due to #1, #2, and possibly #3  Treating underlying causes. Chronic Kidney Disease, Stage 3a:   Chronic disease, stable  eGFR stable at 53 and creatinine stable at 1.0  Avoid nephrotoxic drugs. Continue hydration due to hyponatremia. Serial BMPs to monitor kidney function. Normocytic Anemia:   New onset  Possibly due to CKD  Will repeat CBC to monitor anemia  Hypertension:   Chronic disease, stable but still elevated  Significantly elevated over the last 24 hours. Managed currently with losartan and metoprolol. Started on hydralazine today. Continue medication regiment during stay. Vertigo:   Chronic disease, stable  Managed with meclizine and lifestyle modifications. Continue medication regiment during stay. Insomnia:  Chronic problem  Could be contributing factor to #4. Managed with clonidine. Continue medication regiment during stay. CC:  Weakness    HPI: Vikki Morales is a 80 y.o. female with a history of CKD, vertigo, insomnia and bladder cancer, who presented to the ER complaining of weakness today. The patient visited her PCP today for weakness and hypertension and an order was placed for a holter monitor and echo.  She states that

## 2023-05-04 VITALS
BODY MASS INDEX: 24.43 KG/M2 | TEMPERATURE: 98.6 F | HEART RATE: 77 BPM | HEIGHT: 68 IN | WEIGHT: 161.2 LBS | DIASTOLIC BLOOD PRESSURE: 54 MMHG | OXYGEN SATURATION: 93 % | RESPIRATION RATE: 16 BRPM | SYSTOLIC BLOOD PRESSURE: 120 MMHG

## 2023-05-04 LAB
ANION GAP SERPL CALC-SCNC: 10 MEQ/L (ref 8–16)
BASOPHILS ABSOLUTE: 0 THOU/MM3 (ref 0–0.1)
BASOPHILS NFR BLD AUTO: 0.2 %
BUN SERPL-MCNC: 17 MG/DL (ref 7–22)
CALCIUM SERPL-MCNC: 8.8 MG/DL (ref 8.5–10.5)
CHLORIDE SERPL-SCNC: 99 MEQ/L (ref 98–111)
CO2 SERPL-SCNC: 24 MEQ/L (ref 23–33)
CREAT SERPL-MCNC: 1 MG/DL (ref 0.4–1.2)
DEPRECATED RDW RBC AUTO: 42.6 FL (ref 35–45)
EOSINOPHIL NFR BLD AUTO: 4.2 %
EOSINOPHILS ABSOLUTE: 0.4 THOU/MM3 (ref 0–0.4)
ERYTHROCYTE [DISTWIDTH] IN BLOOD BY AUTOMATED COUNT: 13 % (ref 11.5–14.5)
GFR SERPL CREATININE-BSD FRML MDRD: 53 ML/MIN/1.73M2
GLUCOSE SERPL-MCNC: 137 MG/DL (ref 70–108)
HCT VFR BLD AUTO: 29.5 % (ref 37–47)
HGB BLD-MCNC: 9.6 GM/DL (ref 12–16)
IMM GRANULOCYTES # BLD AUTO: 0.04 THOU/MM3 (ref 0–0.07)
IMM GRANULOCYTES NFR BLD AUTO: 0.5 %
LYMPHOCYTES ABSOLUTE: 1.9 THOU/MM3 (ref 1–4.8)
LYMPHOCYTES NFR BLD AUTO: 22 %
MCH RBC QN AUTO: 29.4 PG (ref 26–33)
MCHC RBC AUTO-ENTMCNC: 32.5 GM/DL (ref 32.2–35.5)
MCV RBC AUTO: 90.5 FL (ref 81–99)
MONOCYTES ABSOLUTE: 0.9 THOU/MM3 (ref 0.4–1.3)
MONOCYTES NFR BLD AUTO: 10.2 %
NEUTROPHILS NFR BLD AUTO: 62.9 %
NRBC BLD AUTO-RTO: 0 /100 WBC
PLATELET # BLD AUTO: 249 THOU/MM3 (ref 130–400)
PMV BLD AUTO: 9.7 FL (ref 9.4–12.4)
POTASSIUM SERPL-SCNC: 4.6 MEQ/L (ref 3.5–5.2)
RBC # BLD AUTO: 3.26 MILL/MM3 (ref 4.2–5.4)
SEGMENTED NEUTROPHILS ABSOLUTE COUNT: 5.4 THOU/MM3 (ref 1.8–7.7)
SODIUM SERPL-SCNC: 133 MEQ/L (ref 135–145)
WBC # BLD AUTO: 8.6 THOU/MM3 (ref 4.8–10.8)

## 2023-05-04 PROCEDURE — 6370000000 HC RX 637 (ALT 250 FOR IP): Performed by: PHYSICIAN ASSISTANT

## 2023-05-04 PROCEDURE — 2580000003 HC RX 258: Performed by: PHYSICIAN ASSISTANT

## 2023-05-04 PROCEDURE — 99239 HOSP IP/OBS DSCHRG MGMT >30: CPT

## 2023-05-04 PROCEDURE — 36415 COLL VENOUS BLD VENIPUNCTURE: CPT

## 2023-05-04 PROCEDURE — 85025 COMPLETE CBC W/AUTO DIFF WBC: CPT

## 2023-05-04 PROCEDURE — 80048 BASIC METABOLIC PNL TOTAL CA: CPT

## 2023-05-04 PROCEDURE — 6360000002 HC RX W HCPCS: Performed by: PHYSICIAN ASSISTANT

## 2023-05-04 RX ORDER — CLONIDINE HYDROCHLORIDE 0.1 MG/1
0.1 TABLET ORAL 2 TIMES DAILY
Qty: 60 TABLET | Refills: 3 | Status: SHIPPED
Start: 2023-05-04

## 2023-05-04 RX ADMIN — SODIUM CHLORIDE, PRESERVATIVE FREE 10 ML: 5 INJECTION INTRAVENOUS at 08:50

## 2023-05-04 RX ADMIN — CLONIDINE HYDROCHLORIDE 0.1 MG: 0.1 TABLET ORAL at 08:49

## 2023-05-04 RX ADMIN — ASPIRIN 81 MG 81 MG: 81 TABLET ORAL at 08:49

## 2023-05-04 RX ADMIN — HYDRALAZINE HYDROCHLORIDE 25 MG: 25 TABLET, FILM COATED ORAL at 08:49

## 2023-05-04 RX ADMIN — LOSARTAN POTASSIUM 50 MG: 50 TABLET, FILM COATED ORAL at 08:49

## 2023-05-04 RX ADMIN — METOPROLOL SUCCINATE 50 MG: 50 TABLET, EXTENDED RELEASE ORAL at 08:49

## 2023-05-04 RX ADMIN — ENOXAPARIN SODIUM 40 MG: 100 INJECTION SUBCUTANEOUS at 08:49

## 2023-05-04 NOTE — DISCHARGE SUMMARY
Glom Filt Rate 53 (A) >60 ml/min/1.73m2        Microbiology:    Blood culture #1:   Lab Results   Component Value Date/Time    BC No growth 24 hours. 05/03/2023 06:46 AM     Blood culture #2:No results found for: Rae Parsons  Organism:  No results found for: LABGRAM  MRSA culture only:No results found for: [facility]  Urine culture:   Lab Results   Component Value Date/Time    LABURIN SEE NOTE 11/08/2022 02:56 PM     Lab Results   Component Value Date/Time    ORG gram positive cocci 05/02/2023 05:35 PM      Respiratory culture: No results found for: CULTRESP  Aerobic and Anaerobic :  No results found for: LABAERO  No results found for: LABANAE    Urinalysis:     Lab Results   Component Value Date/Time    NITRU NEGATIVE 05/02/2023 05:35 PM    WBCUA 5-9 05/02/2023 05:35 PM    WBCUA 50-75 09/23/2011 02:00 AM    BACTERIA NONE SEEN 05/02/2023 05:35 PM    RBCUA NONE 05/02/2023 05:35 PM    BLOODU NEGATIVE 05/02/2023 05:35 PM    SPECGRAV 1.015 03/27/2023 10:44 AM    SPECGRAV 1.015 02/02/2021 09:18 AM    GLUCOSEU NEGATIVE 05/02/2023 05:35 PM       Radiology:  XR CHEST (2 VW)    Result Date: 5/2/2023  PROCEDURE: XR CHEST (2 VW) CLINICAL INFORMATION: r/o pneumonia COMPARISON: Chest radiograph 2/2/2021 TECHNIQUE: PA and lateral views of the chest performed. FINDINGS: Consolidative opacities are seen in the right lower lobe and left lower lobe. Cardiac silhouette is not enlarged. Aortic arch calcifications No pleural effusion. No pneumothorax. No acute bony abnormality. The bones are demineralized. Degenerative changes of the thoracic spine     1. Consolidative opacities are seen in the right lower lobe and left lower lobe. Findings relate to infiltrates. **This report has been created using voice recognition software. It may contain minor errors which are inherent in voice recognition technology. ** Final report electronically signed by Dr Milo Huber on 5/2/2023 6:51 PM       Consults:   None    Discharge Medications:

## 2023-05-04 NOTE — PLAN OF CARE
Problem: Discharge Planning  Goal: Discharge to home or other facility with appropriate resources  Outcome: Completed  Flowsheets (Taken 5/4/2023 0844)  Discharge to home or other facility with appropriate resources: Identify barriers to discharge with patient and caregiver     Problem: Safety - Adult  Goal: Free from fall injury  Outcome: Completed     Problem: ABCDS Injury Assessment  Goal: Absence of physical injury  9/5/0043 0989 by Jb Armstrong RN  Outcome: Completed  5/3/2023 2343 by Malgorzata Jones RN  Outcome: Progressing

## 2023-05-04 NOTE — PLAN OF CARE
Problem: ABCDS Injury Assessment  Goal: Absence of physical injury  Outcome: Progressing     Pt remains free of physical injury this day. Educated her on the use of call light and to request assistance when needing to get out of bed. Gripper socks applied to feet and pt bed in lowest position.

## 2023-05-04 NOTE — CARE COORDINATION
5/4/23, 11:43 AM EDT    Patient goals/plan/ treatment preferences discussed by  and . Patient goals/plan/ treatment preferences reviewed with patient/ family. Patient/ family verbalize understanding of discharge plan and are in agreement with goal/plan/treatment preferences. Understanding was demonstrated using the teach back method. AVS provided by RN at time of discharge, which includes all necessary medical information pertaining to the patients current course of illness, treatment, post-discharge goals of care, and treatment preferences.      Services At/After Discharge: None       IMM Letter  IMM Letter given to Patient/Family/Significant other/Guardian/POA/by[de-identified] patient registration  IMM Letter date given[de-identified] 05/02/23  IMM Letter time given[de-identified] 3428

## 2023-05-05 ENCOUNTER — CARE COORDINATION (OUTPATIENT)
Dept: CASE MANAGEMENT | Age: 88
End: 2023-05-05

## 2023-05-05 DIAGNOSIS — E87.1 HYPONATREMIA: Primary | ICD-10-CM

## 2023-05-05 LAB
BACTERIA BLD AEROBE CULT: NORMAL
BACTERIA BLD AEROBE CULT: NORMAL
BACTERIA UR CULT: ABNORMAL
ORGANISM: ABNORMAL

## 2023-05-05 PROCEDURE — 1111F DSCHRG MED/CURRENT MED MERGE: CPT | Performed by: FAMILY MEDICINE

## 2023-05-05 NOTE — CARE COORDINATION
follow-up call in 5-7 days based on severity of symptoms and risk factors.   Plan for next call: symptom management-dehydration sx?  self management-bp?, fluid intake, output?  follow-up appointment-as above  community resources-denied need  referrals-RPM declined    Leti Edmondson, RN

## 2023-05-08 LAB
BACTERIA BLD AEROBE CULT: NORMAL
BACTERIA BLD AEROBE CULT: NORMAL

## 2023-05-11 ENCOUNTER — CARE COORDINATION (OUTPATIENT)
Dept: CASE MANAGEMENT | Age: 88
End: 2023-05-11

## 2023-05-11 NOTE — CARE COORDINATION
Care Transitions Outreach Attempt    Patient: Dudley Cortez Patient : 7/15/1931 MRN: <[id number]>  Reason for Admission: Dehydration, Hyponatremia  Discharge Date: 23         RARS: Readmission Risk Score: 13  Facility: [facility] 23-23       Last Discharge [address]       Date Complaint Diagnosis Description Type Department Provider    23 Fatigue; Hypertension Pneumonia of both lower lobes due to infectious organism . .. ED to Hosp-Admission (Discharged) (ADMITTED) STRZ 8A Stanley Romero PA-C; Tony Qureshi. .. Noted following upcoming appointments from discharge chart review:   [facility] follow up appointment(s):   Future Appointments   Date Time Provider Mary Zee   2023 10:00 AM Jason Montes MD AFL [facility] MED AFL [facility]   2023 10:30 AM STR HM EXAM RM 01 STRZ EKG [facility]     Attempt to reach for Care Transition follow up call unsuccessful. HIPAA compliant message left requesting call back.

## 2023-05-12 ENCOUNTER — HOSPITAL ENCOUNTER (OUTPATIENT)
Age: 88
Discharge: HOME | End: 2023-05-12
Payer: MEDICARE

## 2023-05-12 ENCOUNTER — HOSPITAL ENCOUNTER (OUTPATIENT)
Dept: GENERAL RADIOLOGY | Age: 88
Discharge: HOME | End: 2023-05-12
Payer: MEDICARE

## 2023-05-12 DIAGNOSIS — J18.9 PNEUMONIA OF BOTH LUNGS DUE TO INFECTIOUS ORGANISM, UNSPECIFIED PART OF LUNG: ICD-10-CM

## 2023-05-12 PROCEDURE — 71046 X-RAY EXAM CHEST 2 VIEWS: CPT

## 2023-05-15 ENCOUNTER — CARE COORDINATION (OUTPATIENT)
Dept: CASE MANAGEMENT | Age: 88
End: 2023-05-15

## 2023-05-15 NOTE — CARE COORDINATION
[facility] Care Transitions Follow Up Call    Patient Current Location: [facility] Coordinator contacted the family by telephone to follow up after admission on 23. Verified name and  with family as identifiers. Patient: Sandy Cortez  Patient : 7/15/1931   MRN: <[id number]>  Reason for Admission: Dehydration, Hyponatremia  Discharge Date: 23 RARS: Readmission Risk Score: 13      Needs to be reviewed by the provider   Additional needs identified to be addressed with provider: No  none             Method of communication with provider: none. Spoke with Thejuan jose Baltazar (daughter) No HIPAA form on file. Daughter reported that patient's has had 3 episode of elevate B/P so patient will be seeing Dr Ja Bautista on . She reported that patient had another chest x-Ray and that the PNA has resolve. She stated that it was ok for further follow up. Addressed changes since last contact:   PNA has resolved  Discussed follow-up appointments. If no appointment was previously scheduled, appointment scheduling offered: Yes. Is follow up appointment scheduled within 7 days of discharge? No.    Follow Up  Future Appointments   Date Time Provider Mary Zee   2023 10:30 AM STR HM EXAM RM 01 STRZ EKG [facility] HOD   2023 11:15 AM Hal Garcia MD N SRPX Heart [facility] Transitions Subsequent and Final Call    Subsequent and Final Calls  Care Transitions Interventions  Other Interventions:             LPN Care Coordinator provided contact information for future needs. Plan for follow-up call in 5-7 days based on severity of symptoms and risk factors.   Plan for next call: symptom management-,  self management-.    Dolores Talley LPN

## 2023-05-16 ENCOUNTER — APPOINTMENT (OUTPATIENT)
Dept: NON INVASIVE DIAGNOSTICS | Age: 88
End: 2023-05-16
Payer: MEDICARE

## 2023-05-16 ENCOUNTER — HOSPITAL ENCOUNTER (OUTPATIENT)
Dept: NON INVASIVE DIAGNOSTICS | Age: 88
Discharge: HOME | End: 2023-05-16
Payer: MEDICARE

## 2023-05-16 DIAGNOSIS — R00.2 PALPITATIONS: ICD-10-CM

## 2023-05-16 PROCEDURE — 93225 XTRNL ECG REC<48 HRS REC: CPT

## 2023-05-16 PROCEDURE — 93226 XTRNL ECG REC<48 HR SCAN A/R: CPT

## 2023-05-22 ENCOUNTER — CARE COORDINATION (OUTPATIENT)
Dept: CASE MANAGEMENT | Age: 88
End: 2023-05-22

## 2023-05-22 ENCOUNTER — OFFICE VISIT (OUTPATIENT)
Dept: CARDIOLOGY CLINIC | Age: 88
End: 2023-05-22
Payer: MEDICARE

## 2023-05-22 VITALS
WEIGHT: 155.6 LBS | HEART RATE: 68 BPM | SYSTOLIC BLOOD PRESSURE: 132 MMHG | BODY MASS INDEX: 23.58 KG/M2 | DIASTOLIC BLOOD PRESSURE: 82 MMHG | HEIGHT: 68 IN

## 2023-05-22 DIAGNOSIS — I10 HYPERTENSION, UNSPECIFIED TYPE: Primary | ICD-10-CM

## 2023-05-22 PROCEDURE — G8427 DOCREV CUR MEDS BY ELIG CLIN: HCPCS | Performed by: INTERNAL MEDICINE

## 2023-05-22 PROCEDURE — 99204 OFFICE O/P NEW MOD 45 MIN: CPT | Performed by: INTERNAL MEDICINE

## 2023-05-22 PROCEDURE — 1036F TOBACCO NON-USER: CPT | Performed by: INTERNAL MEDICINE

## 2023-05-22 PROCEDURE — 1123F ACP DISCUSS/DSCN MKR DOCD: CPT | Performed by: INTERNAL MEDICINE

## 2023-05-22 PROCEDURE — 1111F DSCHRG MED/CURRENT MED MERGE: CPT | Performed by: INTERNAL MEDICINE

## 2023-05-22 PROCEDURE — G8420 CALC BMI NORM PARAMETERS: HCPCS | Performed by: INTERNAL MEDICINE

## 2023-05-22 PROCEDURE — 1090F PRES/ABSN URINE INCON ASSESS: CPT | Performed by: INTERNAL MEDICINE

## 2023-05-22 NOTE — PROGRESS NOTES
[address] [facility] [address]  [place] [address]  Dept: [phone]  Dept Fax: [phone]  Loc: [phone]    Visit Date: 5/22/2023    Ms. Cortez is a 80 y.o. female  who presented for:  Chief Complaint   Patient presents with    New Patient    Hypertension       HPI:   HPI   81 yo F presents for evaluation of HTN. Has had elevated BP and pounding in her ears. She notes that she can feel it and BP is in the 200s. She is taking clonidine to help with the BP. She has never had a CVA, MI, carotid, or PAD issues. Cr 1.2  EF 60%, no significant RWMA. No chest pain, angina, LOUIS, orthopnea, PND, sob at rest, palpitations, LE edema, or syncope. Can do all ADLS. She lives by herself. No significant a/t/d.       Current Outpatient Medications:     cloNIDine (CATAPRES) 0.1 MG tablet, Take 1 tablet by mouth 2 times daily, Disp: 60 tablet, Rfl: 3    hydrALAZINE (APRESOLINE) 25 MG tablet, Take 1 tablet by mouth 3 times daily, Disp: 90 tablet, Rfl: 3    losartan (COZAAR) 50 MG tablet, One table by mouth twice daily, Disp: 180 tablet, Rfl: 3    metoprolol succinate (TOPROL XL) 50 MG extended release tablet, TAKE ONE TABLET BY MOUTH TWICE A DAY, Disp: 180 tablet, Rfl: 3    meclizine (ANTIVERT) 25 MG tablet, Take 1 tablet by mouth 3 times daily as needed for Dizziness, Disp: 30 tablet, Rfl: 2    Calcium Carbonate (CALCIUM 600 PO), Take by mouth, Disp: , Rfl:     GLUCOSAMINE SULFATE PO, Take 1,000 mg by mouth, Disp: , Rfl:     aspirin 81 MG tablet, Take 1 tablet by mouth daily, Disp: , Rfl:     therapeutic multivitamin-minerals (THERAGRAN-M) tablet, Take 1 tablet by mouth daily, Disp: , Rfl:     zoster recombinant adjuvanted vaccine [facility]) 50 MCG/0.5ML SUSR injection, Inject 0.5 mLs into the muscle See Admin Instructions 1 dose now and repeat in 2-6 months (Patient not taking: Reported on 5/22/2023), Disp: 0.5 mL, Rfl: 0    Past Medical

## 2023-05-22 NOTE — CARE COORDINATION
[facility] Transitions Follow Up Call    2023    Patient: Vasyl Cortez  Patient : 7/15/1931   MRN: <[id number]>  Reason for Admission: Dehydration, Hyponatremia  Discharge Date: 23 RARS: Readmission Risk Score: 13         Spoke with: na    patient is currently at a doctor's appointment will follow up on another day. Care Transitions Subsequent and Final Call    Subsequent and Final Calls  Care Transitions Interventions   Home Care Waiver: Declined  Other Services: Declined (Comment: RPM)      Transportation Support: Declined      DME Assistance: Declined     Senior Services: Declined    Other Interventions: Follow Up  No future appointments.     Celestina Shoemaker LPN

## 2023-05-23 ENCOUNTER — CARE COORDINATION (OUTPATIENT)
Dept: CASE MANAGEMENT | Age: 88
End: 2023-05-23

## 2023-05-23 RX ORDER — AMLODIPINE BESYLATE 5 MG/1
5 TABLET ORAL DAILY
Qty: 30 TABLET | Refills: 11 | Status: SHIPPED | OUTPATIENT
Start: 2023-05-23

## 2023-05-23 NOTE — CARE COORDINATION
[facility] Care Transitions Follow Up Call    Patient Current Location: [address]  Transition Nurse contacted Clemente Mcdowell Dtr, by telephone to follow up after admission on 23-23. Verified name and  as identifiers. Patient: Leila Cortez  Patient : 7/15/1931   MRN: <[id number]>  Reason for Admission:  Dehydration, Hyponatremia  Discharge Date: 23 RARS: Readmission Risk Score: 13  Facility: [facility]    Needs to be reviewed by the provider   Additional needs identified to be addressed with provider: No       Method of communication with provider: none. Clemente Mcdowell reports Patient doing well. Denies noted or Patient reported distress, weakness, muscle spasms, dizziness, h/a, cp, malaise. Reports approx 1 1/2 wks ago Patient BP up to 229/108. Was seen by cardiologist yesterday who added Norvasc 5 mg q bedtime; confirmed taking, no questions. Advised to keep written log of bp readings for MD review. Reports appetite, fluid intake good w/ b&b wnl. Denies resource needs. Reviewed sx which require immediate MD notification. 23 Dr Royal Root appt  23 Dr Haynes--attended [facility]   Date Time Provider Mary Wrightisti   2023 10:30 AM [facility] Med[facility] [address] [facility]   2024 11:00 AM Petra Goss MD N [facility] - [facility] Transition Nurse reviewed medical action plan and red flags with Dtr and discussed any barriers to care and/or understanding of plan of care after discharge. Discussed appropriate site of care based on symptoms and resources available to patient including: PCP  [address] [facility] [address]. Agrees to contact PCP office for questions related to Patient healthcare.      Patients top risk factors for readmission: medical condition-htn  Interventions to address risk factors:  confirmed  taking Norvasc and all routine meds as directed    Offered patient enrollment in the Remote Patient Monitoring (RPM) program

## 2023-05-29 LAB
ACQUISITION DURATION: NORMAL S
AVERAGE HEART RATE: 76 BPM
HOOKUP DATE: NORMAL
HOOKUP TIME: NORMAL
MAX HEART RATE TIME/DATE: NORMAL
MAX HEART RATE: 114 BPM
MIN HEART RATE TIME/DATE: NORMAL
MIN HEART RATE: 60 BPM
NUMBER OF QRS COMPLEXES: NORMAL
NUMBER OF SUPRAVENTRICULAR COUPLETS: 1
NUMBER OF SUPRAVENTRICULAR ECTOPICS: 873
NUMBER OF SUPRAVENTRICULAR ISOLATED BEATS: 833
NUMBER OF VENTRICULAR BIGEMINAL CYCLES: 0
NUMBER OF VENTRICULAR COUPLETS: 0
NUMBER OF VENTRICULAR ECTOPICS: 3

## 2023-05-30 ENCOUNTER — TELEPHONE (OUTPATIENT)
Dept: CARDIOLOGY CLINIC | Age: 88
End: 2023-05-30

## 2023-06-01 ENCOUNTER — CARE COORDINATION (OUTPATIENT)
Dept: CASE MANAGEMENT | Age: 88
End: 2023-06-01

## 2023-06-01 NOTE — CARE COORDINATION
[facility][1] Care Transitions Follow Up Call    Patient Current Location:  Home: [place] [address]    Care Transition Nurse contacted the patient by telephone to follow up after admission on 23-23. Verified name and  with patient as identifiers. Patient: Marycruz Cortez  Patient : 7/15/1931   MRN: <[id number]>  Reason for Admission: Dehydration, Hyponatremia  Discharge Date: 23 RARS: Readmission Risk Score: 13  Facility: [facility]    Needs to be reviewed by the provider   Additional needs identified to be addressed with provider: No     Method of communication with provider: none. Patient reports doing well, getting stronger. Reports she stopped Hydralazine and continues to take Norvasc and all routine rx as directed. Patient monitoring bp and keeping written list for MD review. Reports /56 today. Denies distress, weakness, muscle spasms, dizziness, h/a, cp, malaise. Reviewed sx which require immediate MD notification. Reports appetite, fluid intake good w/ b&b wnl. Advised heart healthy diet. Denies resource needs. Reports family in/out to check on her frequently. 23 Dr Rylan Terrell appt  23 Dr Haynes--[facility]   Date Time Provider Mary Zee   2023 10:30 AM SULEIMAN Jeong - CNP N [facility] Heart [facility] - [facility]   2024 11:00 AM Katy Fernandez MD N [facility] - [facility] Transition Nurse reviewed medical action plan and red flags with patient and discussed any barriers to care and/or understanding of plan of care after discharge. Discussed appropriate site of care based on symptoms and resources available to patient including: PCP  Specialist  Urgent care clinics  [employer]. The patient agrees to contact  PCP office for questions related to healthcare.      Patients top risk factors for readmission: medical condition-bp med adjustments per MD  Interventions to address risk factors: Assessment and

## 2023-07-16 ENCOUNTER — HOSPITAL ENCOUNTER (OUTPATIENT)
Dept: GENERAL RADIOLOGY | Age: 88
Discharge: HOME | End: 2023-07-16
Payer: MEDICARE

## 2023-07-16 VITALS
HEIGHT: 68 IN | OXYGEN SATURATION: 95 % | RESPIRATION RATE: 16 BRPM | DIASTOLIC BLOOD PRESSURE: 60 MMHG | HEART RATE: 73 BPM | SYSTOLIC BLOOD PRESSURE: 132 MMHG | WEIGHT: 154 LBS | TEMPERATURE: 98.1 F | BODY MASS INDEX: 23.34 KG/M2

## 2023-07-16 PROCEDURE — 96372 THER/PROPH/DIAG INJ SC/IM: CPT

## 2023-07-16 PROCEDURE — 2500000003 HC RX 250 WO HCPCS: Performed by: FAMILY MEDICINE

## 2023-07-16 PROCEDURE — 6360000002 HC RX W HCPCS: Performed by: FAMILY MEDICINE

## 2023-07-16 RX ADMIN — CEFTRIAXONE SODIUM 500 MG: 500 INJECTION, POWDER, FOR SOLUTION INTRAMUSCULAR; INTRAVENOUS at 17:52

## 2023-11-30 ENCOUNTER — OFFICE VISIT (OUTPATIENT)
Dept: CARDIOLOGY CLINIC | Age: 88
End: 2023-11-30
Payer: MEDICARE

## 2023-11-30 VITALS
HEIGHT: 68 IN | WEIGHT: 156 LBS | BODY MASS INDEX: 23.64 KG/M2 | DIASTOLIC BLOOD PRESSURE: 72 MMHG | HEART RATE: 87 BPM | SYSTOLIC BLOOD PRESSURE: 131 MMHG

## 2023-11-30 DIAGNOSIS — I10 ESSENTIAL HYPERTENSION: Primary | ICD-10-CM

## 2023-11-30 PROCEDURE — 99213 OFFICE O/P EST LOW 20 MIN: CPT | Performed by: STUDENT IN AN ORGANIZED HEALTH CARE EDUCATION/TRAINING PROGRAM

## 2023-11-30 PROCEDURE — G8420 CALC BMI NORM PARAMETERS: HCPCS | Performed by: STUDENT IN AN ORGANIZED HEALTH CARE EDUCATION/TRAINING PROGRAM

## 2023-11-30 PROCEDURE — 1036F TOBACCO NON-USER: CPT | Performed by: STUDENT IN AN ORGANIZED HEALTH CARE EDUCATION/TRAINING PROGRAM

## 2023-11-30 PROCEDURE — 1090F PRES/ABSN URINE INCON ASSESS: CPT | Performed by: STUDENT IN AN ORGANIZED HEALTH CARE EDUCATION/TRAINING PROGRAM

## 2023-11-30 PROCEDURE — G8427 DOCREV CUR MEDS BY ELIG CLIN: HCPCS | Performed by: STUDENT IN AN ORGANIZED HEALTH CARE EDUCATION/TRAINING PROGRAM

## 2023-11-30 PROCEDURE — G8484 FLU IMMUNIZE NO ADMIN: HCPCS | Performed by: STUDENT IN AN ORGANIZED HEALTH CARE EDUCATION/TRAINING PROGRAM

## 2023-11-30 PROCEDURE — 1123F ACP DISCUSS/DSCN MKR DOCD: CPT | Performed by: STUDENT IN AN ORGANIZED HEALTH CARE EDUCATION/TRAINING PROGRAM

## 2023-11-30 NOTE — PROGRESS NOTES
[facility] PROFESSIONAL SERVICES  [facility] [address] [address]   Dept: [phone]   Dept Fax: [phone]   Loc: [phone]      Chief Complaint   Patient presents with    Follow-up     6 mth fu      Cardiologist:  Dr. Sera Williamson  79 yo female presents for 6 month f/u. Hx of HTN, preserved EF. No significant medical history. No chest pain, angina, LOUIS, orthopnea, PND, sob at rest, palpitations, LE edema, or syncope. Having some intermittent leg swelling that comes and goes, worse after on her feet or sitting for long periods. Otherwise no new problems.      General:   No fever, no chills, no weight loss, no fatigue  Pulmonary:    No dyspnea, no wheezing  Cardiac:    Denies recent chest pain   GI:     No nausea or vomiting, no abdominal pain  Neuro:      No dizziness or light headedness  Musculoskeletal:  No recent active issues  Extremities:   No edema      Past Medical History:   Diagnosis Date    Bladder cancer ([facility]) 2007    Chronic kidney disease, stage III (moderate) (HCC)     Diastolic dysfunction     Hyperlipidemia     Unspecified essential hypertension        Allergies   Allergen Reactions    Griseofulvin Ultramicrosize [Griseofulvin]     Macrobid [Nitrofurantoin]     Mobic [Meloxicam]     Morphine     Myrbetriq [Mirabegron]     Sulfa Antibiotics     Zocor [Simvastatin]        Current Outpatient Medications   Medication Sig Dispense Refill    predniSONE (DELTASONE) 10 MG tablet 3 tabs per day for 2 days, 2 tabs per day for 2 days, 1 tab per day for 2 days 12 tablet 0    amLODIPine (NORVASC) 5 MG tablet Take 1 tablet by mouth daily 30 tablet 11    cloNIDine (CATAPRES) 0.1 MG tablet Take 1 tablet by mouth 2 times daily 60 tablet 3    losartan (COZAAR) 50 MG tablet One table by mouth twice daily 180 tablet 3    metoprolol succinate (TOPROL XL) 50 MG extended release tablet TAKE ONE TABLET BY MOUTH TWICE A  tablet 3    meclizine (ANTIVERT) 25

## 2024-02-04 PROBLEM — I50.9 ACUTE CONGESTIVE HEART FAILURE, UNSPECIFIED HEART FAILURE TYPE (HCC): Status: ACTIVE | Noted: 2024-02-04

## 2024-03-06 ENCOUNTER — OFFICE VISIT (OUTPATIENT)
Dept: CARDIOLOGY CLINIC | Age: 89
End: 2024-03-06

## 2024-03-06 VITALS
BODY MASS INDEX: 23.4 KG/M2 | HEIGHT: 68 IN | HEART RATE: 68 BPM | SYSTOLIC BLOOD PRESSURE: 158 MMHG | WEIGHT: 154.4 LBS | DIASTOLIC BLOOD PRESSURE: 82 MMHG

## 2024-03-06 DIAGNOSIS — I10 ESSENTIAL HYPERTENSION: Primary | ICD-10-CM

## 2024-03-06 RX ORDER — HYDROCHLOROTHIAZIDE 25 MG/1
25 TABLET ORAL EVERY MORNING
Qty: 90 TABLET | Refills: 1 | Status: SHIPPED | OUTPATIENT
Start: 2024-03-06

## 2024-03-06 RX ORDER — CARVEDILOL 6.25 MG/1
6.25 TABLET ORAL 2 TIMES DAILY
Qty: 60 TABLET | Refills: 3 | Status: SHIPPED | OUTPATIENT
Start: 2024-03-06

## 2024-03-06 NOTE — PROGRESS NOTES
C/o high BP, ringing in the ears,  191/64  64 on 2-29-24 and CHRISTINA, palpitations.            
reviewed the cardiac test below:    ECHO: No results found for this or any previous visit.       Assessment/Plan   HTN  LE swelling  D/c Norvasc, Metoprolol.  Add HCTZ and Coreg.  Titrate up Coreg as tolerated.  Record HR daily.  Goal is < 130/80.  Compression stockings.  Continue Losartan and Clonidine for now.  The patient was advised on risk/benefits of the new Rx and she agreed to proceed with the medication(s).    Discussed symptoms needing emergency care or those which require further medical attention.   Discussed diet/exercise/BP/weight loss/health lifestyle choices/lipids; the patient understands the goals and will try to comply.    Disposition:  1 year           Electronically signed by Hal Haynes MD   3/6/2024 at 11:01 AM EST

## 2024-03-18 NOTE — TELEPHONE ENCOUNTER
Pt calling in an update since med changes at appointment.  BP has been really good, however, HCTZ is making her legs hurt within a couple hours of taking.     136/59 73  140/62 78  137/60 96  136/62 71  125/87 68  112/47 65  144/50 66  154/56 65  144/59 77  127/56 64  153/59 72  138/58 81

## 2024-03-20 RX ORDER — AMLODIPINE BESYLATE 5 MG/1
5 TABLET ORAL DAILY
COMMUNITY
End: 2024-03-20 | Stop reason: SDUPTHER

## 2024-03-22 RX ORDER — AMLODIPINE BESYLATE 5 MG/1
5 TABLET ORAL DAILY
Qty: 90 TABLET | Refills: 3 | Status: SHIPPED | OUTPATIENT
Start: 2024-03-22

## 2024-04-18 RX ORDER — CARVEDILOL 12.5 MG/1
12.5 TABLET ORAL 2 TIMES DAILY
Qty: 180 TABLET | Refills: 3 | Status: SHIPPED | OUTPATIENT
Start: 2024-04-18

## 2024-04-18 NOTE — TELEPHONE ENCOUNTER
Okay. Increase coreg to 12.5 mg BID, I would not check BP more than 2-3 times per day. Morning, evening and maybe once in the middle of the day.

## 2024-06-26 RX ORDER — AMLODIPINE BESYLATE 5 MG/1
5 TABLET ORAL DAILY
Qty: 90 TABLET | Refills: 3 | Status: SHIPPED | OUTPATIENT
Start: 2024-06-26

## 2025-03-06 ENCOUNTER — OFFICE VISIT (OUTPATIENT)
Dept: CARDIOLOGY CLINIC | Age: 89
End: 2025-03-06
Payer: MEDICARE

## 2025-03-06 VITALS
HEIGHT: 68 IN | HEART RATE: 72 BPM | WEIGHT: 153 LBS | DIASTOLIC BLOOD PRESSURE: 82 MMHG | BODY MASS INDEX: 23.19 KG/M2 | SYSTOLIC BLOOD PRESSURE: 130 MMHG

## 2025-03-06 DIAGNOSIS — I10 ESSENTIAL HYPERTENSION: Primary | ICD-10-CM

## 2025-03-06 PROCEDURE — 1123F ACP DISCUSS/DSCN MKR DOCD: CPT | Performed by: INTERNAL MEDICINE

## 2025-03-06 PROCEDURE — 99213 OFFICE O/P EST LOW 20 MIN: CPT | Performed by: INTERNAL MEDICINE

## 2025-03-06 PROCEDURE — 1090F PRES/ABSN URINE INCON ASSESS: CPT | Performed by: INTERNAL MEDICINE

## 2025-03-06 PROCEDURE — G8420 CALC BMI NORM PARAMETERS: HCPCS | Performed by: INTERNAL MEDICINE

## 2025-03-06 PROCEDURE — 1159F MED LIST DOCD IN RCRD: CPT | Performed by: INTERNAL MEDICINE

## 2025-03-06 PROCEDURE — 1036F TOBACCO NON-USER: CPT | Performed by: INTERNAL MEDICINE

## 2025-03-06 PROCEDURE — G8427 DOCREV CUR MEDS BY ELIG CLIN: HCPCS | Performed by: INTERNAL MEDICINE

## 2025-03-06 RX ORDER — ASCORBIC ACID 500 MG
500 TABLET ORAL DAILY
COMMUNITY

## 2025-03-06 NOTE — PATIENT INSTRUCTIONS
Your staff today were Sharon  Your provider today was Dr. Haynes  Phone number: [phone]

## 2025-03-06 NOTE — PROGRESS NOTES
[facility] [place] [facility]  [facility] CARDIOLOGY  [address]  [address]  [facility] [place] [address]  Dept: [phone]  Dept Fax: [phone]  Loc: [phone]    Visit Date: 3/6/2025    Ms. Cortez is a 89 y.o. female  who presented for:  Chief Complaint   Patient presents with    1 Year Follow Up    Hypertension       HPI:     History of Present Illness  89 F patient presents for a checkup.    She reports no current health issues, including chest pain or respiratory discomfort. She has been experiencing fluctuations in her blood pressure, with the highest recorded systolic reading being 200. These elevated readings are often associated with urinary tract infections (UTIs), a condition she is prone to due to her history of bladder cancer. Apart from these instances, her blood pressure is generally well-managed. She also mentions an episode of confusion that coincided with a UTI.     No issues with ADLs.  Daughter helps with care.  No chest pain, angina, LOUIS, orthopnea, PND, sob at rest, palpitations, LE edema, or syncope.               Current Outpatient Medications:     vitamin C (ASCORBIC ACID) 500 MG tablet, Take 1 tablet by mouth daily, Disp: , Rfl:     cloNIDine (CATAPRES) 0.1 MG tablet, TAKE ONE TABLET BY MOUTH TWICE A DAY, Disp: 180 tablet, Rfl: 0    amLODIPine (NORVASC) 5 MG tablet, Take 1 tablet by mouth daily, Disp: 90 tablet, Rfl: 3    carvedilol (COREG) 12.5 MG tablet, Take 1 tablet by mouth 2 times daily, Disp: 180 tablet, Rfl: 3    meclizine (ANTIVERT) 25 MG tablet, Take 1 tablet by mouth 3 times daily as needed for Dizziness, Disp: 30 tablet, Rfl: 3    losartan (COZAAR) 50 MG tablet, One table by mouth twice daily, Disp: 90 tablet, Rfl: 3    Calcium Carbonate (CALCIUM 600 PO), Take by mouth, Disp: , Rfl:     aspirin 81 MG tablet, Take 1 tablet by mouth daily, Disp: , Rfl:     therapeutic multivitamin-minerals (THERAGRAN-M) tablet, Take 1 tablet by mouth daily, Disp: ,

## 2025-04-14 RX ORDER — CARVEDILOL 12.5 MG/1
12.5 TABLET ORAL 2 TIMES DAILY
Qty: 180 TABLET | Refills: 3 | OUTPATIENT
Start: 2025-04-14

## 2025-04-16 RX ORDER — CARVEDILOL 12.5 MG/1
12.5 TABLET ORAL 2 TIMES DAILY
Qty: 180 TABLET | Refills: 3 | OUTPATIENT
Start: 2025-04-16

## 2025-04-17 RX ORDER — CARVEDILOL 12.5 MG/1
12.5 TABLET ORAL 2 TIMES DAILY
Qty: 180 TABLET | Refills: 0 | Status: SHIPPED | OUTPATIENT
Start: 2025-04-17

## 2025-04-17 NOTE — TELEPHONE ENCOUNTER
Cehlly is requesting a refill of their   Requested Prescriptions     Pending Prescriptions Disp Refills    carvedilol (COREG) 12.5 MG tablet 180 tablet 3     Sig: Take 1 tablet by mouth 2 times daily   . Please advise.      Last Appt:  Visit date not found  Next Appt:   Visit date not found  Preferred pharmacy:     [facility] - [address] -  [phone] - F [phone]       Patient unsure why medication was refused, she states was seen in office in march and she is out of medication now.

## 2025-04-17 NOTE — TELEPHONE ENCOUNTER
Patient is PRN with Dr. Haynes.  Spoke to daughter Yris on HIPAA.  She will call PCP for future refills.

## 2025-04-23 ENCOUNTER — HOSPITAL ENCOUNTER (EMERGENCY)
Age: 89
Discharge: HOME | End: 2025-04-23
Attending: EMERGENCY MEDICINE
Payer: MEDICARE

## 2025-04-23 ENCOUNTER — APPOINTMENT (OUTPATIENT)
Dept: GENERAL RADIOLOGY | Age: 89
End: 2025-04-23
Payer: MEDICARE

## 2025-04-23 VITALS
BODY MASS INDEX: 23.26 KG/M2 | TEMPERATURE: 98.6 F | OXYGEN SATURATION: 96 % | DIASTOLIC BLOOD PRESSURE: 83 MMHG | SYSTOLIC BLOOD PRESSURE: 168 MMHG | WEIGHT: 153 LBS | HEART RATE: 88 BPM | RESPIRATION RATE: 16 BRPM

## 2025-04-23 DIAGNOSIS — M79.601 ARM PAIN, ANTERIOR, RIGHT: Primary | ICD-10-CM

## 2025-04-23 PROCEDURE — 99283 EMERGENCY DEPT VISIT LOW MDM: CPT

## 2025-04-23 PROCEDURE — 6370000000 HC RX 637 (ALT 250 FOR IP): Performed by: EMERGENCY MEDICINE

## 2025-04-23 PROCEDURE — 73060 X-RAY EXAM OF HUMERUS: CPT

## 2025-04-23 PROCEDURE — 73080 X-RAY EXAM OF ELBOW: CPT

## 2025-04-23 RX ORDER — HYDROCODONE BITARTRATE AND ACETAMINOPHEN 5; 325 MG/1; MG/1
1 TABLET ORAL ONCE
Status: COMPLETED | OUTPATIENT
Start: 2025-04-23 | End: 2025-04-23

## 2025-04-23 RX ORDER — HYDROCODONE BITARTRATE AND ACETAMINOPHEN 5; 325 MG/1; MG/1
1 TABLET ORAL EVERY 8 HOURS PRN
Qty: 10 TABLET | Refills: 0 | Status: SHIPPED | OUTPATIENT
Start: 2025-04-23 | End: 2025-04-28

## 2025-04-23 RX ADMIN — HYDROCODONE BITARTRATE AND ACETAMINOPHEN 1 TABLET: 5; 325 TABLET ORAL at 18:28

## 2025-04-23 RX ADMIN — HYDROCODONE BITARTRATE AND ACETAMINOPHEN 1 TABLET: 5; 325 TABLET ORAL at 21:39

## 2025-04-23 ASSESSMENT — PAIN DESCRIPTION - LOCATION
LOCATION: ARM

## 2025-04-23 ASSESSMENT — PAIN SCALES - GENERAL
PAINLEVEL_OUTOF10: 3
PAINLEVEL_OUTOF10: 10
PAINLEVEL_OUTOF10: 5

## 2025-04-23 ASSESSMENT — PAIN DESCRIPTION - ORIENTATION
ORIENTATION: RIGHT

## 2025-04-23 ASSESSMENT — PAIN - FUNCTIONAL ASSESSMENT
PAIN_FUNCTIONAL_ASSESSMENT: 0-10
PAIN_FUNCTIONAL_ASSESSMENT: 0-10

## 2025-04-23 ASSESSMENT — PAIN DESCRIPTION - DESCRIPTORS: DESCRIPTORS: THROBBING

## 2025-04-23 NOTE — ED NOTES
Pt resting on cot at this time. No needs voiced. States the pain medicine is helping her pain. She is now rating pain 5/10. VSS

## 2025-04-23 NOTE — ED PROVIDER NOTES
[facility] EMERGENCY SERVICES ENCOUNTER        PATIENT NAME: Chelly Cortez  MRN: [id number]  : 7/15/1931  LOUIS: 2025  PROVIDER: José Miguel Silva MD    Patient was seen and evaluated at 6:08 PM EDT. Nurses Notes are reviewed and I agree except as noted in the HPI.  Chief Complaint   Patient presents with    Arm Pain     HISTORY OF PRESENT ILLNESS     A 89-year-old woman presents with right arm pain.  Patient says she tried to pull something at home yesterday, and ever since then, she noticed worsening pain from her right mid upper arm which is worse flexing or extending right elbow.  She does not recall other injury.    PAST MEDICAL HISTORY    has a past medical history of Bladder cancer (HCC), Chronic kidney disease, stage III (moderate) (HCC), Diastolic dysfunction, Hyperlipidemia, and Unspecified essential hypertension.    SURGICAL HISTORY      has a past surgical history that includes joint replacement (Right).    CURRENT MEDICATIONS       Discharge Medication List as of 2025  9:37 PM        CONTINUE these medications which have NOT CHANGED    Details   carvedilol (COREG) 12.5 MG tablet Take 1 tablet by mouth 2 times daily, Disp-180 tablet, R-0Please send future refills to PCP office.Normal      vitamin C (ASCORBIC ACID) 500 MG tablet Take 1 tablet by mouth dailyHistorical Med      cloNIDine (CATAPRES) 0.1 MG tablet TAKE ONE TABLET BY MOUTH TWICE A DAY, Disp-180 tablet, R-0Normal      amLODIPine (NORVASC) 5 MG tablet Take 1 tablet by mouth daily, Disp-90 tablet, R-3Normal      meclizine (ANTIVERT) 25 MG tablet Take 1 tablet by mouth 3 times daily as needed for Dizziness, Disp-30 tablet, R-3Normal      losartan (COZAAR) 50 MG tablet One table by mouth twice daily, Disp-90 tablet, R-3Normal      Calcium Carbonate (CALCIUM 600 PO) Take by mouthHistorical Med      GLUCOSAMINE SULFATE PO Take 1,000 mg by mouthHistorical Med      aspirin 81 MG tablet Take 1 tablet by mouth

## 2025-06-18 ENCOUNTER — HOSPITAL ENCOUNTER (OUTPATIENT)
Age: 89
Discharge: HOME | End: 2025-06-18
Payer: MEDICARE

## 2025-06-18 DIAGNOSIS — I10 PRIMARY HYPERTENSION: ICD-10-CM

## 2025-06-18 DIAGNOSIS — E78.2 MIXED HYPERLIPIDEMIA: ICD-10-CM

## 2025-06-18 DIAGNOSIS — R60.9 EDEMA, UNSPECIFIED TYPE: ICD-10-CM

## 2025-06-18 LAB
ALBUMIN SERPL BCG-MCNC: 3.7 G/DL (ref 3.4–4.9)
ALP SERPL-CCNC: 63 U/L (ref 38–126)
ALT SERPL W/O P-5'-P-CCNC: 11 U/L (ref 10–35)
ANION GAP SERPL CALC-SCNC: 10 MEQ/L (ref 8–16)
AST SERPL-CCNC: 18 U/L (ref 10–35)
BASOPHILS ABSOLUTE: 0 THOU/MM3 (ref 0–0.1)
BASOPHILS NFR BLD AUTO: 0.4 %
BILIRUB CONJ SERPL-MCNC: < 0.1 MG/DL (ref 0–0.2)
BILIRUB SERPL-MCNC: 0.3 MG/DL (ref 0.3–1.2)
BUN SERPL-MCNC: 14 MG/DL (ref 8–23)
CALCIUM SERPL-MCNC: 9.6 MG/DL (ref 8.2–9.6)
CHLORIDE SERPL-SCNC: 100 MEQ/L (ref 98–111)
CHOLEST SERPL-MCNC: 189 MG/DL (ref 100–199)
CO2 SERPL-SCNC: 26 MEQ/L (ref 22–29)
CREAT SERPL-MCNC: 1.2 MG/DL (ref 0.5–0.9)
DEPRECATED RDW RBC AUTO: 43.1 FL (ref 35–45)
EOSINOPHIL NFR BLD AUTO: 4.2 %
EOSINOPHILS ABSOLUTE: 0.3 THOU/MM3 (ref 0–0.4)
ERYTHROCYTE [DISTWIDTH] IN BLOOD BY AUTOMATED COUNT: 13 % (ref 11.5–14.5)
GFR SERPL CREATININE-BSD FRML MDRD: 42 ML/MIN/1.73M2
GLUCOSE SERPL-MCNC: 110 MG/DL (ref 74–109)
HCT VFR BLD AUTO: 34.5 % (ref 37–47)
HDLC SERPL-MCNC: 47 MG/DL
HGB BLD-MCNC: 10.8 GM/DL (ref 12–16)
IMM GRANULOCYTES # BLD AUTO: 0.03 THOU/MM3 (ref 0–0.07)
IMM GRANULOCYTES NFR BLD AUTO: 0.4 %
LDLC SERPL CALC-MCNC: 121 MG/DL
LYMPHOCYTES ABSOLUTE: 2.5 THOU/MM3 (ref 1–4.8)
LYMPHOCYTES NFR BLD AUTO: 37.1 %
MCH RBC QN AUTO: 28.8 PG (ref 26–33)
MCHC RBC AUTO-ENTMCNC: 31.3 GM/DL (ref 32.2–35.5)
MCV RBC AUTO: 92 FL (ref 81–99)
MONOCYTES ABSOLUTE: 0.6 THOU/MM3 (ref 0.4–1.3)
MONOCYTES NFR BLD AUTO: 8.9 %
NEUTROPHILS ABSOLUTE: 3.3 THOU/MM3 (ref 1.8–7.7)
NEUTROPHILS NFR BLD AUTO: 49 %
NRBC BLD AUTO-RTO: 0 /100 WBC
PLATELET # BLD AUTO: 353 THOU/MM3 (ref 130–400)
PMV BLD AUTO: 9.7 FL (ref 9.4–12.4)
POTASSIUM SERPL-SCNC: 4.8 MEQ/L (ref 3.5–5.2)
PROT SERPL-MCNC: 7 G/DL (ref 6.4–8.3)
RBC # BLD AUTO: 3.75 MILL/MM3 (ref 4.2–5.4)
SODIUM SERPL-SCNC: 136 MEQ/L (ref 135–145)
TRIGL SERPL-MCNC: 103 MG/DL (ref 0–199)
TSH SERPL DL<=0.05 MIU/L-ACNC: 2.42 UIU/ML (ref 0.27–4.2)
WBC # BLD AUTO: 6.8 THOU/MM3 (ref 4.8–10.8)

## 2025-06-18 PROCEDURE — 80053 COMPREHEN METABOLIC PANEL: CPT

## 2025-06-18 PROCEDURE — 82248 BILIRUBIN DIRECT: CPT

## 2025-06-18 PROCEDURE — 84443 ASSAY THYROID STIM HORMONE: CPT

## 2025-06-18 PROCEDURE — 80061 LIPID PANEL: CPT

## 2025-06-18 PROCEDURE — 85025 COMPLETE CBC W/AUTO DIFF WBC: CPT

## 2025-06-18 PROCEDURE — 36415 COLL VENOUS BLD VENIPUNCTURE: CPT

## 2025-07-02 RX ORDER — AMLODIPINE BESYLATE 5 MG/1
5 TABLET ORAL DAILY
Qty: 90 TABLET | Refills: 3 | OUTPATIENT
Start: 2025-07-02